# Patient Record
Sex: FEMALE | Race: WHITE | NOT HISPANIC OR LATINO
[De-identification: names, ages, dates, MRNs, and addresses within clinical notes are randomized per-mention and may not be internally consistent; named-entity substitution may affect disease eponyms.]

---

## 2017-04-06 ENCOUNTER — LABORATORY RESULT (OUTPATIENT)
Age: 47
End: 2017-04-06

## 2017-04-06 ENCOUNTER — APPOINTMENT (OUTPATIENT)
Dept: HEART AND VASCULAR | Facility: CLINIC | Age: 47
End: 2017-04-06

## 2017-04-06 VITALS
BODY MASS INDEX: 21.86 KG/M2 | HEART RATE: 65 BPM | HEIGHT: 66 IN | DIASTOLIC BLOOD PRESSURE: 68 MMHG | SYSTOLIC BLOOD PRESSURE: 116 MMHG | WEIGHT: 136 LBS

## 2017-04-10 LAB
ALBUMIN SERPL ELPH-MCNC: 4.6 G/DL
ALP BLD-CCNC: 35 U/L
ALT SERPL-CCNC: 9 U/L
ANION GAP SERPL CALC-SCNC: 18 MMOL/L
AST SERPL-CCNC: 13 U/L
BASOPHILS # BLD AUTO: 0.04 K/UL
BASOPHILS NFR BLD AUTO: 0.6 %
BILIRUB SERPL-MCNC: 0.5 MG/DL
BUN SERPL-MCNC: 17 MG/DL
CALCIUM SERPL-MCNC: 9.1 MG/DL
CHLORIDE SERPL-SCNC: 105 MMOL/L
CHOLEST SERPL-MCNC: 248 MG/DL
CHOLEST/HDLC SERPL: 3.2 RATIO
CO2 SERPL-SCNC: 19 MMOL/L
CREAT SERPL-MCNC: 0.88 MG/DL
EOSINOPHIL # BLD AUTO: 0.19 K/UL
EOSINOPHIL NFR BLD AUTO: 2.8 %
GLUCOSE SERPL-MCNC: 73 MG/DL
HBA1C MFR BLD HPLC: 5.6 %
HCT VFR BLD CALC: 35.4 %
HDLC SERPL-MCNC: 77 MG/DL
HGB BLD-MCNC: 10.9 G/DL
IMM GRANULOCYTES NFR BLD AUTO: 0 %
LDLC SERPL CALC-MCNC: 159 MG/DL
LYMPHOCYTES # BLD AUTO: 2.65 K/UL
LYMPHOCYTES NFR BLD AUTO: 38.4 %
MAN DIFF?: NORMAL
MCHC RBC-ENTMCNC: 25.8 PG
MCHC RBC-ENTMCNC: 30.8 GM/DL
MCV RBC AUTO: 83.7 FL
MONOCYTES # BLD AUTO: 0.65 K/UL
MONOCYTES NFR BLD AUTO: 9.4 %
NEUTROPHILS # BLD AUTO: 3.37 K/UL
NEUTROPHILS NFR BLD AUTO: 48.8 %
PLATELET # BLD AUTO: 237 K/UL
POTASSIUM SERPL-SCNC: 4.2 MMOL/L
PROT SERPL-MCNC: 7.4 G/DL
RBC # BLD: 4.23 M/UL
RBC # FLD: 14.2 %
SODIUM SERPL-SCNC: 142 MMOL/L
TRIGL SERPL-MCNC: 62 MG/DL
TSH SERPL-ACNC: 0.8 UIU/ML
WBC # FLD AUTO: 6.9 K/UL

## 2017-04-13 ENCOUNTER — OTHER (OUTPATIENT)
Age: 47
End: 2017-04-13

## 2017-07-19 ENCOUNTER — APPOINTMENT (OUTPATIENT)
Dept: HEART AND VASCULAR | Facility: CLINIC | Age: 47
End: 2017-07-19

## 2017-07-19 VITALS
DIASTOLIC BLOOD PRESSURE: 82 MMHG | BODY MASS INDEX: 21.38 KG/M2 | HEIGHT: 66 IN | WEIGHT: 133 LBS | HEART RATE: 65 BPM | SYSTOLIC BLOOD PRESSURE: 118 MMHG

## 2017-07-19 DIAGNOSIS — R68.89 OTHER GENERAL SYMPTOMS AND SIGNS: ICD-10-CM

## 2017-07-19 DIAGNOSIS — Z87.898 PERSONAL HISTORY OF OTHER SPECIFIED CONDITIONS: ICD-10-CM

## 2017-07-20 PROBLEM — R68.89 OCCASIONAL FATIGUE: Status: ACTIVE | Noted: 2017-07-20

## 2017-07-20 PROBLEM — Z87.898 HISTORY OF CHRONIC FATIGUE: Status: RESOLVED | Noted: 2017-04-06 | Resolved: 2017-07-20

## 2017-07-24 LAB
ANION GAP SERPL CALC-SCNC: 15 MMOL/L
BASOPHILS # BLD AUTO: 0.02 K/UL
BASOPHILS NFR BLD AUTO: 0.3 %
BUN SERPL-MCNC: 12 MG/DL
CALCIUM SERPL-MCNC: 9.8 MG/DL
CHLORIDE SERPL-SCNC: 107 MMOL/L
CO2 SERPL-SCNC: 21 MMOL/L
CREAT SERPL-MCNC: 0.9 MG/DL
EOSINOPHIL # BLD AUTO: 0.12 K/UL
EOSINOPHIL NFR BLD AUTO: 1.7 %
FERRITIN SERPL-MCNC: 9 NG/ML
GLUCOSE SERPL-MCNC: 85 MG/DL
HCT VFR BLD CALC: 38.2 %
HCYS SERPL-MCNC: 8.9 UMOL/L
HGB BLD-MCNC: 11.8 G/DL
IMM GRANULOCYTES NFR BLD AUTO: 0 %
IRON SATN MFR SERPL: 26 %
IRON SERPL-MCNC: 103 UG/DL
LYMPHOCYTES # BLD AUTO: 2.12 K/UL
LYMPHOCYTES NFR BLD AUTO: 30.6 %
MAN DIFF?: NORMAL
MCHC RBC-ENTMCNC: 27.4 PG
MCHC RBC-ENTMCNC: 30.9 GM/DL
MCV RBC AUTO: 88.6 FL
MONOCYTES # BLD AUTO: 0.67 K/UL
MONOCYTES NFR BLD AUTO: 9.7 %
NEUTROPHILS # BLD AUTO: 3.99 K/UL
NEUTROPHILS NFR BLD AUTO: 57.7 %
PLATELET # BLD AUTO: 240 K/UL
POTASSIUM SERPL-SCNC: 4.7 MMOL/L
RBC # BLD: 4.31 M/UL
RBC # FLD: 14.6 %
SODIUM SERPL-SCNC: 143 MMOL/L
TIBC SERPL-MCNC: 399 UG/DL
UIBC SERPL-MCNC: 296 UG/DL
WBC # FLD AUTO: 6.92 K/UL

## 2018-04-12 ENCOUNTER — APPOINTMENT (OUTPATIENT)
Dept: HEART AND VASCULAR | Facility: CLINIC | Age: 48
End: 2018-04-12

## 2018-04-13 ENCOUNTER — APPOINTMENT (OUTPATIENT)
Dept: HEART AND VASCULAR | Facility: CLINIC | Age: 48
End: 2018-04-13
Payer: COMMERCIAL

## 2018-04-13 VITALS
DIASTOLIC BLOOD PRESSURE: 64 MMHG | HEART RATE: 70 BPM | HEIGHT: 66 IN | BODY MASS INDEX: 22.34 KG/M2 | WEIGHT: 139 LBS | SYSTOLIC BLOOD PRESSURE: 110 MMHG

## 2018-04-13 PROCEDURE — 36415 COLL VENOUS BLD VENIPUNCTURE: CPT

## 2018-04-13 PROCEDURE — 93000 ELECTROCARDIOGRAM COMPLETE: CPT

## 2018-04-13 PROCEDURE — 99396 PREV VISIT EST AGE 40-64: CPT | Mod: 25

## 2018-04-16 LAB
ALBUMIN SERPL ELPH-MCNC: 4.1 G/DL
ALP BLD-CCNC: 33 U/L
ALT SERPL-CCNC: 10 U/L
ANION GAP SERPL CALC-SCNC: 13 MMOL/L
AST SERPL-CCNC: 13 U/L
BASOPHILS # BLD AUTO: 0.01 K/UL
BASOPHILS NFR BLD AUTO: 0.1 %
BILIRUB SERPL-MCNC: 0.2 MG/DL
BUN SERPL-MCNC: 12 MG/DL
CALCIUM SERPL-MCNC: 8.8 MG/DL
CHLORIDE SERPL-SCNC: 102 MMOL/L
CHOLEST SERPL-MCNC: 218 MG/DL
CHOLEST/HDLC SERPL: 3.3 RATIO
CO2 SERPL-SCNC: 26 MMOL/L
CREAT SERPL-MCNC: 0.74 MG/DL
EOSINOPHIL # BLD AUTO: 0.12 K/UL
EOSINOPHIL NFR BLD AUTO: 1.4 %
FERRITIN SERPL-MCNC: 10 NG/ML
GLUCOSE SERPL-MCNC: 79 MG/DL
HBA1C MFR BLD HPLC: 5.3 %
HCT VFR BLD CALC: 38.9 %
HDLC SERPL-MCNC: 67 MG/DL
HGB BLD-MCNC: 11.8 G/DL
IMM GRANULOCYTES NFR BLD AUTO: 0.2 %
LDLC SERPL CALC-MCNC: 136 MG/DL
LYMPHOCYTES # BLD AUTO: 2.55 K/UL
LYMPHOCYTES NFR BLD AUTO: 29.8 %
MAN DIFF?: NORMAL
MCHC RBC-ENTMCNC: 26.3 PG
MCHC RBC-ENTMCNC: 30.3 GM/DL
MCV RBC AUTO: 86.8 FL
MONOCYTES # BLD AUTO: 0.75 K/UL
MONOCYTES NFR BLD AUTO: 8.8 %
NEUTROPHILS # BLD AUTO: 5.1 K/UL
NEUTROPHILS NFR BLD AUTO: 59.7 %
PLATELET # BLD AUTO: 303 K/UL
POTASSIUM SERPL-SCNC: 4.5 MMOL/L
PROT SERPL-MCNC: 6.5 G/DL
RBC # BLD: 4.48 M/UL
RBC # FLD: 16.3 %
SODIUM SERPL-SCNC: 141 MMOL/L
TRIGL SERPL-MCNC: 75 MG/DL
TSH SERPL-ACNC: 0.98 UIU/ML
WBC # FLD AUTO: 8.55 K/UL

## 2018-04-17 ENCOUNTER — APPOINTMENT (OUTPATIENT)
Dept: HEART AND VASCULAR | Facility: CLINIC | Age: 48
End: 2018-04-17

## 2018-06-14 ENCOUNTER — FORM ENCOUNTER (OUTPATIENT)
Age: 48
End: 2018-06-14

## 2018-06-15 ENCOUNTER — APPOINTMENT (OUTPATIENT)
Dept: MAMMOGRAPHY | Facility: HOSPITAL | Age: 48
End: 2018-06-15
Payer: COMMERCIAL

## 2018-06-15 ENCOUNTER — OUTPATIENT (OUTPATIENT)
Dept: OUTPATIENT SERVICES | Facility: HOSPITAL | Age: 48
LOS: 1 days | End: 2018-06-15
Payer: COMMERCIAL

## 2018-06-15 PROCEDURE — 77067 SCR MAMMO BI INCL CAD: CPT

## 2018-06-15 PROCEDURE — 77063 BREAST TOMOSYNTHESIS BI: CPT

## 2018-06-15 PROCEDURE — 77067 SCR MAMMO BI INCL CAD: CPT | Mod: 26

## 2018-06-15 PROCEDURE — 77063 BREAST TOMOSYNTHESIS BI: CPT | Mod: 26

## 2018-06-28 ENCOUNTER — FORM ENCOUNTER (OUTPATIENT)
Age: 48
End: 2018-06-28

## 2018-06-29 ENCOUNTER — APPOINTMENT (OUTPATIENT)
Dept: ULTRASOUND IMAGING | Facility: HOSPITAL | Age: 48
End: 2018-06-29
Payer: COMMERCIAL

## 2018-06-29 ENCOUNTER — OUTPATIENT (OUTPATIENT)
Dept: OUTPATIENT SERVICES | Facility: HOSPITAL | Age: 48
LOS: 1 days | End: 2018-06-29
Payer: COMMERCIAL

## 2018-06-29 PROCEDURE — 76641 ULTRASOUND BREAST COMPLETE: CPT

## 2018-06-29 PROCEDURE — 76641 ULTRASOUND BREAST COMPLETE: CPT | Mod: 26,50

## 2018-07-12 ENCOUNTER — FORM ENCOUNTER (OUTPATIENT)
Age: 48
End: 2018-07-12

## 2018-07-13 ENCOUNTER — APPOINTMENT (OUTPATIENT)
Dept: MAMMOGRAPHY | Facility: HOSPITAL | Age: 48
End: 2018-07-13
Payer: COMMERCIAL

## 2018-07-13 ENCOUNTER — OUTPATIENT (OUTPATIENT)
Dept: OUTPATIENT SERVICES | Facility: HOSPITAL | Age: 48
LOS: 1 days | End: 2018-07-13
Payer: COMMERCIAL

## 2018-07-13 PROCEDURE — 77065 DX MAMMO INCL CAD UNI: CPT

## 2018-07-13 PROCEDURE — 77065 DX MAMMO INCL CAD UNI: CPT | Mod: 26,LT

## 2018-10-03 ENCOUNTER — LABORATORY RESULT (OUTPATIENT)
Age: 48
End: 2018-10-03

## 2018-10-04 LAB
FERRITIN SERPL-MCNC: 20 NG/ML
IRON SATN MFR SERPL: 36 %
IRON SERPL-MCNC: 137 UG/DL
TIBC SERPL-MCNC: 378 UG/DL
UIBC SERPL-MCNC: 241 UG/DL

## 2018-10-11 ENCOUNTER — APPOINTMENT (OUTPATIENT)
Dept: HEART AND VASCULAR | Facility: CLINIC | Age: 48
End: 2018-10-11

## 2018-11-30 ENCOUNTER — APPOINTMENT (OUTPATIENT)
Dept: HEART AND VASCULAR | Facility: CLINIC | Age: 48
End: 2018-11-30
Payer: COMMERCIAL

## 2018-11-30 VITALS
HEART RATE: 80 BPM | SYSTOLIC BLOOD PRESSURE: 116 MMHG | HEIGHT: 66 IN | DIASTOLIC BLOOD PRESSURE: 72 MMHG | WEIGHT: 134 LBS | BODY MASS INDEX: 21.53 KG/M2

## 2018-11-30 DIAGNOSIS — R42 DIZZINESS AND GIDDINESS: ICD-10-CM

## 2018-11-30 PROCEDURE — 99214 OFFICE O/P EST MOD 30 MIN: CPT | Mod: 25

## 2018-11-30 PROCEDURE — 36415 COLL VENOUS BLD VENIPUNCTURE: CPT

## 2018-11-30 PROCEDURE — 93000 ELECTROCARDIOGRAM COMPLETE: CPT

## 2018-12-02 ENCOUNTER — FORM ENCOUNTER (OUTPATIENT)
Age: 48
End: 2018-12-02

## 2018-12-03 ENCOUNTER — APPOINTMENT (OUTPATIENT)
Dept: MAMMOGRAPHY | Facility: HOSPITAL | Age: 48
End: 2018-12-03
Payer: COMMERCIAL

## 2018-12-03 ENCOUNTER — OUTPATIENT (OUTPATIENT)
Dept: OUTPATIENT SERVICES | Facility: HOSPITAL | Age: 48
LOS: 1 days | End: 2018-12-03
Payer: COMMERCIAL

## 2018-12-03 LAB
ANION GAP SERPL CALC-SCNC: 12 MMOL/L
APPEARANCE: CLEAR
APPEARANCE: CLEAR
BACTERIA: NEGATIVE
BASOPHILS # BLD AUTO: 0.02 K/UL
BASOPHILS NFR BLD AUTO: 0.2 %
BILIRUBIN URINE: NEGATIVE
BILIRUBIN URINE: NEGATIVE
BLOOD URINE: NEGATIVE
BLOOD URINE: NEGATIVE
BUN SERPL-MCNC: 17 MG/DL
CALCIUM SERPL-MCNC: 9.5 MG/DL
CHLORIDE SERPL-SCNC: 104 MMOL/L
CO2 SERPL-SCNC: 22 MMOL/L
COLOR: YELLOW
COLOR: YELLOW
CREAT SERPL-MCNC: 0.85 MG/DL
EOSINOPHIL # BLD AUTO: 0.11 K/UL
EOSINOPHIL NFR BLD AUTO: 1.4 %
FERRITIN SERPL-MCNC: 6 NG/ML
GLUCOSE QUALITATIVE U: NEGATIVE MG/DL
GLUCOSE QUALITATIVE U: NEGATIVE MG/DL
GLUCOSE SERPL-MCNC: 89 MG/DL
HBA1C MFR BLD HPLC: 5 %
HCT VFR BLD CALC: 36.1 %
HGB BLD-MCNC: 11.6 G/DL
IMM GRANULOCYTES NFR BLD AUTO: 0.1 %
IRON SATN MFR SERPL: 6 %
IRON SERPL-MCNC: 27 UG/DL
KETONES URINE: ABNORMAL
KETONES URINE: ABNORMAL
LEUKOCYTE ESTERASE URINE: NEGATIVE
LEUKOCYTE ESTERASE URINE: NEGATIVE
LYMPHOCYTES # BLD AUTO: 2.38 K/UL
LYMPHOCYTES NFR BLD AUTO: 29.6 %
MAN DIFF?: NORMAL
MCHC RBC-ENTMCNC: 28.7 PG
MCHC RBC-ENTMCNC: 32.1 GM/DL
MCV RBC AUTO: 89.4 FL
MICROSCOPIC-UA: NORMAL
MONOCYTES # BLD AUTO: 0.68 K/UL
MONOCYTES NFR BLD AUTO: 8.5 %
NEUTROPHILS # BLD AUTO: 4.83 K/UL
NEUTROPHILS NFR BLD AUTO: 60.2 %
NITRITE URINE: NEGATIVE
NITRITE URINE: NEGATIVE
PH URINE: 5.5
PH URINE: 5.5
PLATELET # BLD AUTO: 330 K/UL
POTASSIUM SERPL-SCNC: 4.3 MMOL/L
PROTEIN URINE: NEGATIVE MG/DL
PROTEIN URINE: NEGATIVE MG/DL
RBC # BLD: 4.04 M/UL
RBC # FLD: 12.4 %
RED BLOOD CELLS URINE: 1 /HPF
SODIUM SERPL-SCNC: 138 MMOL/L
SPECIFIC GRAVITY URINE: 1.02
SPECIFIC GRAVITY URINE: 1.02
SQUAMOUS EPITHELIAL CELLS: 2 /HPF
TIBC SERPL-MCNC: 451 UG/DL
UIBC SERPL-MCNC: 424 UG/DL
UROBILINOGEN URINE: NEGATIVE MG/DL
UROBILINOGEN URINE: NEGATIVE MG/DL
WBC # FLD AUTO: 8.03 K/UL
WHITE BLOOD CELLS URINE: 4 /HPF

## 2018-12-03 PROCEDURE — G0279: CPT | Mod: 26

## 2018-12-03 PROCEDURE — 77065 DX MAMMO INCL CAD UNI: CPT | Mod: 26,LT

## 2018-12-03 PROCEDURE — 77065 DX MAMMO INCL CAD UNI: CPT

## 2018-12-03 PROCEDURE — G0279: CPT

## 2018-12-03 RX ORDER — PYRIDOXINE HCL (VITAMIN B6) 100 MG
18 TABLET ORAL TWICE DAILY
Qty: 60 | Refills: 0 | Status: DISCONTINUED | COMMUNITY
Start: 2017-07-19 | End: 2018-12-03

## 2018-12-03 NOTE — PHYSICAL EXAM
[General Appearance - Well Developed] : well developed [Normal Appearance] : normal appearance [Well Groomed] : well groomed [General Appearance - Well Nourished] : well nourished [No Deformities] : no deformities [General Appearance - In No Acute Distress] : no acute distress [Normal Conjunctiva] : the conjunctiva exhibited no abnormalities [Eyelids - No Xanthelasma] : the eyelids demonstrated no xanthelasmas [Normal Oral Mucosa] : normal oral mucosa [No Oral Pallor] : no oral pallor [No Oral Cyanosis] : no oral cyanosis [Normal Jugular Venous A Waves Present] : normal jugular venous A waves present [Normal Jugular Venous V Waves Present] : normal jugular venous V waves present [No Jugular Venous Herbert A Waves] : no jugular venous herbert A waves [Respiration, Rhythm And Depth] : normal respiratory rhythm and effort [Exaggerated Use Of Accessory Muscles For Inspiration] : no accessory muscle use [Auscultation Breath Sounds / Voice Sounds] : lungs were clear to auscultation bilaterally [Heart Rate And Rhythm] : heart rate and rhythm were normal [Heart Sounds] : normal S1 and S2 [Murmurs] : no murmurs present [Abdomen Soft] : soft [Abdomen Tenderness] : non-tender [Abdomen Mass (___ Cm)] : no abdominal mass palpated [Abnormal Walk] : normal gait [Gait - Sufficient For Exercise Testing] : the gait was sufficient for exercise testing [Nail Clubbing] : no clubbing of the fingernails [Cyanosis, Localized] : no localized cyanosis [Petechial Hemorrhages (___cm)] : no petechial hemorrhages [Skin Color & Pigmentation] : normal skin color and pigmentation [] : no rash [No Venous Stasis] : no venous stasis [Skin Lesions] : no skin lesions [No Skin Ulcers] : no skin ulcer [No Xanthoma] : no  xanthoma was observed [Oriented To Time, Place, And Person] : oriented to person, place, and time [Affect] : the affect was normal [Mood] : the mood was normal [No Anxiety] : not feeling anxious

## 2018-12-03 NOTE — REVIEW OF SYSTEMS
[Feeling Fatigued] : feeling fatigued [Loss Of Hearing] : hearing loss [see HPI] : see HPI [Negative] : Heme/Lymph

## 2018-12-03 NOTE — HISTORY OF PRESENT ILLNESS
[FreeTextEntry1] : The patient was taking her iron 324 mg every other day. She reduced it because she felt that it caused frequency. Three weeks ago she stopped it. She has no dysuria. She had nocturia also. She is dizzy upon arising. She does not have vertigo. It is mild occasional and passes spontaneously. It also occurs when she turns. It occurs usually once sometimes twice a month. Her periods have been slightly lighter.

## 2018-12-03 NOTE — DISCUSSION/SUMMARY
[FreeTextEntry1] : The patient has iron deficiency anemia. An iron tablet daily with vitamin C was recommended. She has frequency and a urinalysis is normal. It is resolved off iron pills. She will change and take a different iron tablet. Her dizziness is mild. She will arise more slowly.

## 2018-12-10 ENCOUNTER — APPOINTMENT (OUTPATIENT)
Dept: VASCULAR SURGERY | Facility: CLINIC | Age: 48
End: 2018-12-10
Payer: COMMERCIAL

## 2018-12-10 VITALS
WEIGHT: 134 LBS | BODY MASS INDEX: 21.53 KG/M2 | TEMPERATURE: 98.6 F | OXYGEN SATURATION: 98 % | DIASTOLIC BLOOD PRESSURE: 72 MMHG | HEART RATE: 97 BPM | HEIGHT: 66 IN | SYSTOLIC BLOOD PRESSURE: 110 MMHG

## 2018-12-10 PROCEDURE — 99203 OFFICE O/P NEW LOW 30 MIN: CPT

## 2018-12-17 ENCOUNTER — APPOINTMENT (OUTPATIENT)
Dept: MAMMOGRAPHY | Facility: HOSPITAL | Age: 48
End: 2018-12-17

## 2018-12-17 ENCOUNTER — APPOINTMENT (OUTPATIENT)
Dept: HEART AND VASCULAR | Facility: CLINIC | Age: 48
End: 2018-12-17

## 2018-12-20 ENCOUNTER — APPOINTMENT (OUTPATIENT)
Dept: VASCULAR SURGERY | Facility: CLINIC | Age: 48
End: 2018-12-20
Payer: COMMERCIAL

## 2018-12-20 PROCEDURE — 93970 EXTREMITY STUDY: CPT

## 2019-04-16 ENCOUNTER — APPOINTMENT (OUTPATIENT)
Dept: HEART AND VASCULAR | Facility: CLINIC | Age: 49
End: 2019-04-16

## 2019-04-16 ENCOUNTER — APPOINTMENT (OUTPATIENT)
Dept: HEART AND VASCULAR | Facility: CLINIC | Age: 49
End: 2019-04-16
Payer: COMMERCIAL

## 2019-04-16 VITALS
WEIGHT: 132 LBS | BODY MASS INDEX: 21.21 KG/M2 | HEIGHT: 66 IN | SYSTOLIC BLOOD PRESSURE: 108 MMHG | HEART RATE: 66 BPM | DIASTOLIC BLOOD PRESSURE: 60 MMHG

## 2019-04-16 PROCEDURE — 99396 PREV VISIT EST AGE 40-64: CPT | Mod: 25

## 2019-04-16 PROCEDURE — 36415 COLL VENOUS BLD VENIPUNCTURE: CPT

## 2019-04-16 PROCEDURE — 93000 ELECTROCARDIOGRAM COMPLETE: CPT

## 2019-04-17 NOTE — DISCUSSION/SUMMARY
[FreeTextEntry1] : The patient is taking a small dose of iron but her iron tests are normal. Her hemoglobin is also normal. Her breast biopsy was negative. She is up to date on health maintenance.

## 2019-04-17 NOTE — HISTORY OF PRESENT ILLNESS
[FreeTextEntry1] : The patient had a breast biopsy and it is reported normal.She cycles once a week. Her diet is fair. She has abnormal periods and was diagnosed with endometriosis. Her biopsy was negative. She has seen ENT. She had an eye examination in the summer a Pap smear in November and a mammogram recently. She is fasting.

## 2019-04-17 NOTE — PHYSICAL EXAM
[General Appearance - Well Developed] : well developed [Well Groomed] : well groomed [Normal Appearance] : normal appearance [No Deformities] : no deformities [General Appearance - Well Nourished] : well nourished [General Appearance - In No Acute Distress] : no acute distress [Normal Conjunctiva] : the conjunctiva exhibited no abnormalities [Eyelids - No Xanthelasma] : the eyelids demonstrated no xanthelasmas [No Oral Pallor] : no oral pallor [Normal Oral Mucosa] : normal oral mucosa [No Oral Cyanosis] : no oral cyanosis [Normal Jugular Venous A Waves Present] : normal jugular venous A waves present [Normal Jugular Venous V Waves Present] : normal jugular venous V waves present [No Jugular Venous Herbert A Waves] : no jugular venous herbert A waves [Respiration, Rhythm And Depth] : normal respiratory rhythm and effort [Exaggerated Use Of Accessory Muscles For Inspiration] : no accessory muscle use [Auscultation Breath Sounds / Voice Sounds] : lungs were clear to auscultation bilaterally [Murmurs] : no murmurs present [Heart Rate And Rhythm] : heart rate and rhythm were normal [Heart Sounds] : normal S1 and S2 [Abdomen Soft] : soft [Abdomen Tenderness] : non-tender [Abdomen Mass (___ Cm)] : no abdominal mass palpated [Abnormal Walk] : normal gait [Gait - Sufficient For Exercise Testing] : the gait was sufficient for exercise testing [Nail Clubbing] : no clubbing of the fingernails [Cyanosis, Localized] : no localized cyanosis [Petechial Hemorrhages (___cm)] : no petechial hemorrhages [Skin Color & Pigmentation] : normal skin color and pigmentation [] : no rash [No Venous Stasis] : no venous stasis [Skin Lesions] : no skin lesions [No Xanthoma] : no  xanthoma was observed [No Skin Ulcers] : no skin ulcer [FreeTextEntry1] : Breast exam refused [Oriented To Time, Place, And Person] : oriented to person, place, and time [Affect] : the affect was normal [Mood] : the mood was normal [No Anxiety] : not feeling anxious

## 2019-04-18 LAB
25(OH)D3 SERPL-MCNC: 16.3 NG/ML
ALBUMIN SERPL ELPH-MCNC: 4.6 G/DL
ALP BLD-CCNC: 35 U/L
ALT SERPL-CCNC: 11 U/L
ANION GAP SERPL CALC-SCNC: 18 MMOL/L
AST SERPL-CCNC: 15 U/L
BASOPHILS # BLD AUTO: 0.04 K/UL
BASOPHILS NFR BLD AUTO: 0.8 %
BILIRUB SERPL-MCNC: 0.3 MG/DL
BUN SERPL-MCNC: 14 MG/DL
CALCIUM SERPL-MCNC: 9.4 MG/DL
CHLORIDE SERPL-SCNC: 103 MMOL/L
CHOLEST SERPL-MCNC: 228 MG/DL
CHOLEST/HDLC SERPL: 3.2 RATIO
CO2 SERPL-SCNC: 22 MMOL/L
CREAT SERPL-MCNC: 0.82 MG/DL
EOSINOPHIL # BLD AUTO: 0.13 K/UL
EOSINOPHIL NFR BLD AUTO: 2.5 %
ESTIMATED AVERAGE GLUCOSE: 97 MG/DL
FERRITIN SERPL-MCNC: 21 NG/ML
FOLATE SERPL-MCNC: 12.1 NG/ML
GLUCOSE SERPL-MCNC: 89 MG/DL
HBA1C MFR BLD HPLC: 5 %
HCT VFR BLD CALC: 38.9 %
HDLC SERPL-MCNC: 71 MG/DL
HGB BLD-MCNC: 11.6 G/DL
IMM GRANULOCYTES NFR BLD AUTO: 0.4 %
IRON SATN MFR SERPL: 37 %
IRON SERPL-MCNC: 151 UG/DL
LDLC SERPL CALC-MCNC: 145 MG/DL
LYMPHOCYTES # BLD AUTO: 1.64 K/UL
LYMPHOCYTES NFR BLD AUTO: 31.8 %
MAN DIFF?: NORMAL
MCHC RBC-ENTMCNC: 29 PG
MCHC RBC-ENTMCNC: 29.8 GM/DL
MCV RBC AUTO: 97.3 FL
MONOCYTES # BLD AUTO: 0.45 K/UL
MONOCYTES NFR BLD AUTO: 8.7 %
NEUTROPHILS # BLD AUTO: 2.88 K/UL
NEUTROPHILS NFR BLD AUTO: 55.8 %
PLATELET # BLD AUTO: 283 K/UL
POTASSIUM SERPL-SCNC: 4.8 MMOL/L
PROT SERPL-MCNC: 6.9 G/DL
RBC # BLD: 4 M/UL
RBC # FLD: 13.2 %
SODIUM SERPL-SCNC: 143 MMOL/L
TIBC SERPL-MCNC: 412 UG/DL
TRIGL SERPL-MCNC: 61 MG/DL
TSH SERPL-ACNC: 1.4 UIU/ML
UIBC SERPL-MCNC: 261 UG/DL
VIT B12 SERPL-MCNC: 416 PG/ML
WBC # FLD AUTO: 5.16 K/UL

## 2019-08-30 ENCOUNTER — APPOINTMENT (OUTPATIENT)
Dept: ORTHOPEDIC SURGERY | Facility: CLINIC | Age: 49
End: 2019-08-30

## 2019-09-25 ENCOUNTER — APPOINTMENT (OUTPATIENT)
Dept: HEART AND VASCULAR | Facility: CLINIC | Age: 49
End: 2019-09-25
Payer: COMMERCIAL

## 2019-09-25 VITALS
WEIGHT: 135 LBS | HEIGHT: 66 IN | DIASTOLIC BLOOD PRESSURE: 78 MMHG | SYSTOLIC BLOOD PRESSURE: 110 MMHG | BODY MASS INDEX: 21.69 KG/M2 | OXYGEN SATURATION: 99 % | HEART RATE: 80 BPM

## 2019-09-25 DIAGNOSIS — M79.89 OTHER SPECIFIED SOFT TISSUE DISORDERS: ICD-10-CM

## 2019-09-25 PROCEDURE — 99213 OFFICE O/P EST LOW 20 MIN: CPT

## 2019-09-25 RX ORDER — DEXTROMETHORPHAN HB/DOXYLAMINE 15-6.25/15
27 SOLUTION, ORAL ORAL
Refills: 0 | Status: DISCONTINUED | COMMUNITY
Start: 2018-12-03 | End: 2019-09-25

## 2019-09-25 NOTE — HISTORY OF PRESENT ILLNESS
[FreeTextEntry1] : The patient describes left leg swelling since August. She was seen in urgent care and her d-dimer was okay. She was seen by sports medicine diagnosed with bursitis and given diclofenac cream. This caused stomach upset and was discontinued. The patient walks eight-10,000 steps a day. Initially she had knee pain but now has none. She feels that her whole leg is swollen but not her foot. She was seen for varicose veins last December and an ultrasound was done. The patient is not fasting.

## 2019-09-25 NOTE — DISCUSSION/SUMMARY
[FreeTextEntry1] : The patient has varicose veins. Her leg is not visibly swollen. She has no pain. She was referred to Dr. Hooks. She will elevate her legs and get compression stockings.

## 2019-09-25 NOTE — PHYSICAL EXAM
[General Appearance - Well Developed] : well developed [Normal Appearance] : normal appearance [General Appearance - Well Nourished] : well nourished [Well Groomed] : well groomed [No Deformities] : no deformities [General Appearance - In No Acute Distress] : no acute distress [Eyelids - No Xanthelasma] : the eyelids demonstrated no xanthelasmas [Normal Conjunctiva] : the conjunctiva exhibited no abnormalities [No Oral Pallor] : no oral pallor [Normal Oral Mucosa] : normal oral mucosa [Normal Jugular Venous A Waves Present] : normal jugular venous A waves present [No Oral Cyanosis] : no oral cyanosis [Normal Jugular Venous V Waves Present] : normal jugular venous V waves present [No Jugular Venous Herbert A Waves] : no jugular venous herbert A waves [Respiration, Rhythm And Depth] : normal respiratory rhythm and effort [Auscultation Breath Sounds / Voice Sounds] : lungs were clear to auscultation bilaterally [Exaggerated Use Of Accessory Muscles For Inspiration] : no accessory muscle use [Heart Rate And Rhythm] : heart rate and rhythm were normal [Heart Sounds] : normal S1 and S2 [Murmurs] : no murmurs present [Abdomen Soft] : soft [Abdomen Tenderness] : non-tender [Abdomen Mass (___ Cm)] : no abdominal mass palpated [Abnormal Walk] : normal gait [Gait - Sufficient For Exercise Testing] : the gait was sufficient for exercise testing [Nail Clubbing] : no clubbing of the fingernails [Cyanosis, Localized] : no localized cyanosis [Petechial Hemorrhages (___cm)] : no petechial hemorrhages [Skin Color & Pigmentation] : normal skin color and pigmentation [] : no rash [Skin Lesions] : no skin lesions [No Skin Ulcers] : no skin ulcer [No Xanthoma] : no  xanthoma was observed [FreeTextEntry1] : varicose veins [Oriented To Time, Place, And Person] : oriented to person, place, and time [Affect] : the affect was normal [Mood] : the mood was normal [No Anxiety] : not feeling anxious

## 2019-09-26 DIAGNOSIS — D64.9 ANEMIA, UNSPECIFIED: ICD-10-CM

## 2019-09-28 LAB
25(OH)D3 SERPL-MCNC: 36.6 NG/ML
ALBUMIN SERPL ELPH-MCNC: 4.8 G/DL
ALP BLD-CCNC: 44 U/L
ALT SERPL-CCNC: 8 U/L
ANION GAP SERPL CALC-SCNC: 15 MMOL/L
AST SERPL-CCNC: 18 U/L
BASOPHILS # BLD AUTO: 0.04 K/UL
BASOPHILS NFR BLD AUTO: 0.5 %
BILIRUB SERPL-MCNC: 0.3 MG/DL
BUN SERPL-MCNC: 12 MG/DL
CALCIUM SERPL-MCNC: 9.5 MG/DL
CHLORIDE SERPL-SCNC: 102 MMOL/L
CHOLEST SERPL-MCNC: 239 MG/DL
CHOLEST/HDLC SERPL: 3 RATIO
CO2 SERPL-SCNC: 23 MMOL/L
CREAT SERPL-MCNC: 0.73 MG/DL
EOSINOPHIL # BLD AUTO: 0.18 K/UL
EOSINOPHIL NFR BLD AUTO: 2.3 %
FERRITIN SERPL-MCNC: 5 NG/ML
GLUCOSE SERPL-MCNC: 84 MG/DL
HCT VFR BLD CALC: 34.9 %
HDLC SERPL-MCNC: 79 MG/DL
HGB BLD-MCNC: 10.5 G/DL
IMM GRANULOCYTES NFR BLD AUTO: 0.3 %
IRON SATN MFR SERPL: 5 %
IRON SERPL-MCNC: 28 UG/DL
LDLC SERPL CALC-MCNC: 149 MG/DL
LYMPHOCYTES # BLD AUTO: 2.46 K/UL
LYMPHOCYTES NFR BLD AUTO: 31.5 %
MAN DIFF?: NORMAL
MCHC RBC-ENTMCNC: 24.9 PG
MCHC RBC-ENTMCNC: 30.1 GM/DL
MCV RBC AUTO: 82.9 FL
MONOCYTES # BLD AUTO: 0.71 K/UL
MONOCYTES NFR BLD AUTO: 9.1 %
NEUTROPHILS # BLD AUTO: 4.39 K/UL
NEUTROPHILS NFR BLD AUTO: 56.3 %
PLATELET # BLD AUTO: 312 K/UL
POTASSIUM SERPL-SCNC: 4 MMOL/L
PROT SERPL-MCNC: 7.3 G/DL
RBC # BLD: 4.21 M/UL
RBC # FLD: 14.3 %
SODIUM SERPL-SCNC: 140 MMOL/L
TIBC SERPL-MCNC: 540 UG/DL
TRIGL SERPL-MCNC: 53 MG/DL
TSH SERPL-ACNC: 1.43 UIU/ML
UIBC SERPL-MCNC: 512 UG/DL
WBC # FLD AUTO: 7.8 K/UL

## 2019-10-15 ENCOUNTER — APPOINTMENT (OUTPATIENT)
Dept: VASCULAR SURGERY | Facility: CLINIC | Age: 49
End: 2019-10-15
Payer: COMMERCIAL

## 2019-10-15 DIAGNOSIS — I83.90 ASYMPTOMATIC VARICOSE VEINS OF UNSPECIFIED LOWER EXTREMITY: ICD-10-CM

## 2019-10-15 PROCEDURE — 99213 OFFICE O/P EST LOW 20 MIN: CPT

## 2019-10-17 PROBLEM — I83.90 VARICOSE VEINS: Status: ACTIVE | Noted: 2018-12-10

## 2019-10-17 NOTE — HISTORY OF PRESENT ILLNESS
[FreeTextEntry1] : 49 y/o F with bilateral varicose veins comes for FU. She states over the last few months she has noticed left knee pain and swelling. Comes and goes. Went to see her PCP who recommended she use topical NSAIDS which helped. They also recommended PT but she has not started. She wanted to come here and make sure the veins were not causing her symptoms. We recommended stab phlebectomy for her bulging varicose veins and the achiness she was feeling.

## 2019-10-17 NOTE — PHYSICAL EXAM
[2+] : left 2+ [Ankle Swelling (On Exam)] : present [Ankle Swelling Bilaterally] : bilaterally  [Varicose Veins Of Lower Extremities] : bilaterally [Ankle Swelling On The Right] : mild [No Rash or Lesion] : No rash or lesion [Alert] : alert [Oriented to Person] : oriented to person [Oriented to Place] : oriented to place [Oriented to Time] : oriented to time [Calm] : calm [] : not present [de-identified] : Ambulatory [de-identified] : Left knee with mild effusion, VV medial calf bilaterally

## 2019-10-17 NOTE — ASSESSMENT
[FreeTextEntry1] : 49 year old female with left knee pain and swelling. Her current symptoms are not related to her veins. Recommend she FU with orthopedist for the knee pain, hold off on vascular intervention at this time. FU PRN.  [Arterial/Venous Disease] : arterial/venous disease

## 2019-10-21 ENCOUNTER — APPOINTMENT (OUTPATIENT)
Dept: RADIOLOGY | Facility: CLINIC | Age: 49
End: 2019-10-21

## 2019-10-21 ENCOUNTER — APPOINTMENT (OUTPATIENT)
Dept: ORTHOPEDIC SURGERY | Facility: CLINIC | Age: 49
End: 2019-10-21
Payer: COMMERCIAL

## 2019-10-21 ENCOUNTER — OUTPATIENT (OUTPATIENT)
Dept: OUTPATIENT SERVICES | Facility: HOSPITAL | Age: 49
LOS: 1 days | End: 2019-10-21
Payer: COMMERCIAL

## 2019-10-21 ENCOUNTER — FORM ENCOUNTER (OUTPATIENT)
Age: 49
End: 2019-10-21

## 2019-10-21 VITALS — HEIGHT: 66 IN | WEIGHT: 135 LBS | BODY MASS INDEX: 21.69 KG/M2 | RESPIRATION RATE: 16 BRPM

## 2019-10-21 DIAGNOSIS — M22.2X2 PATELLOFEMORAL DISORDERS, LEFT KNEE: ICD-10-CM

## 2019-10-21 DIAGNOSIS — M23.92 UNSPECIFIED INTERNAL DERANGEMENT OF LEFT KNEE: ICD-10-CM

## 2019-10-21 PROCEDURE — 99203 OFFICE O/P NEW LOW 30 MIN: CPT

## 2019-10-22 PROCEDURE — 73564 X-RAY EXAM KNEE 4 OR MORE: CPT | Mod: 26,LT

## 2019-10-22 PROCEDURE — 73564 X-RAY EXAM KNEE 4 OR MORE: CPT

## 2019-12-11 ENCOUNTER — APPOINTMENT (OUTPATIENT)
Dept: ORTHOPEDIC SURGERY | Facility: CLINIC | Age: 49
End: 2019-12-11

## 2019-12-29 ENCOUNTER — FORM ENCOUNTER (OUTPATIENT)
Age: 49
End: 2019-12-29

## 2019-12-30 ENCOUNTER — APPOINTMENT (OUTPATIENT)
Dept: MRI IMAGING | Facility: HOSPITAL | Age: 49
End: 2019-12-30

## 2019-12-30 ENCOUNTER — OUTPATIENT (OUTPATIENT)
Dept: OUTPATIENT SERVICES | Facility: HOSPITAL | Age: 49
LOS: 1 days | End: 2019-12-30
Payer: COMMERCIAL

## 2019-12-30 PROCEDURE — 73721 MRI JNT OF LWR EXTRE W/O DYE: CPT

## 2019-12-30 PROCEDURE — 73721 MRI JNT OF LWR EXTRE W/O DYE: CPT | Mod: 26,LT

## 2020-01-06 ENCOUNTER — APPOINTMENT (OUTPATIENT)
Dept: ORTHOPEDIC SURGERY | Facility: CLINIC | Age: 50
End: 2020-01-06
Payer: COMMERCIAL

## 2020-01-06 VITALS — WEIGHT: 135 LBS | RESPIRATION RATE: 16 BRPM | HEIGHT: 66 IN | BODY MASS INDEX: 21.69 KG/M2

## 2020-01-06 DIAGNOSIS — M22.42 CHONDROMALACIA PATELLAE, LEFT KNEE: ICD-10-CM

## 2020-01-06 PROCEDURE — 99213 OFFICE O/P EST LOW 20 MIN: CPT

## 2020-01-23 ENCOUNTER — LABORATORY RESULT (OUTPATIENT)
Age: 50
End: 2020-01-23

## 2020-01-23 ENCOUNTER — APPOINTMENT (OUTPATIENT)
Dept: HEART AND VASCULAR | Facility: CLINIC | Age: 50
End: 2020-01-23
Payer: COMMERCIAL

## 2020-01-23 VITALS
HEIGHT: 66 IN | HEART RATE: 71 BPM | BODY MASS INDEX: 22.66 KG/M2 | OXYGEN SATURATION: 99 % | SYSTOLIC BLOOD PRESSURE: 102 MMHG | WEIGHT: 141 LBS | DIASTOLIC BLOOD PRESSURE: 62 MMHG

## 2020-01-23 DIAGNOSIS — E55.9 VITAMIN D DEFICIENCY, UNSPECIFIED: ICD-10-CM

## 2020-01-23 PROCEDURE — 99213 OFFICE O/P EST LOW 20 MIN: CPT | Mod: 25

## 2020-01-26 PROBLEM — E55.9 VITAMIN D DEFICIENCY: Status: ACTIVE | Noted: 2019-04-18

## 2020-01-26 RX ORDER — CALCIUM CARBONATE 500(1250)
500 TABLET ORAL
Refills: 0 | Status: DISCONTINUED | COMMUNITY
End: 2020-01-26

## 2020-01-26 RX ORDER — CHROMIUM 200 MCG
1000 TABLET ORAL
Refills: 0 | Status: DISCONTINUED | COMMUNITY
Start: 2019-04-18 | End: 2020-01-26

## 2020-01-26 NOTE — DISCUSSION/SUMMARY
[FreeTextEntry1] : The patient has a history of iron deficiency anemia. She has stopped her iron. Her iron level is low. She will restart it along with her vitamin D. She was encouraged to exercise more. She will followup with her gynecologist. She requests genetic testing for breast cancer despite not having a family history. This was performed.

## 2020-01-26 NOTE — PHYSICAL EXAM
[General Appearance - Well Developed] : well developed [Normal Appearance] : normal appearance [Well Groomed] : well groomed [General Appearance - Well Nourished] : well nourished [No Deformities] : no deformities [General Appearance - In No Acute Distress] : no acute distress [Normal Conjunctiva] : the conjunctiva exhibited no abnormalities [Eyelids - No Xanthelasma] : the eyelids demonstrated no xanthelasmas [Normal Oral Mucosa] : normal oral mucosa [No Oral Pallor] : no oral pallor [No Oral Cyanosis] : no oral cyanosis [Normal Jugular Venous A Waves Present] : normal jugular venous A waves present [Normal Jugular Venous V Waves Present] : normal jugular venous V waves present [No Jugular Venous Herbert A Waves] : no jugular venous herbert A waves [Respiration, Rhythm And Depth] : normal respiratory rhythm and effort [Exaggerated Use Of Accessory Muscles For Inspiration] : no accessory muscle use [Auscultation Breath Sounds / Voice Sounds] : lungs were clear to auscultation bilaterally [Heart Rate And Rhythm] : heart rate and rhythm were normal [Heart Sounds] : normal S1 and S2 [Murmurs] : no murmurs present [Abdomen Soft] : soft [Abdomen Tenderness] : non-tender [Abdomen Mass (___ Cm)] : no abdominal mass palpated [Abnormal Walk] : normal gait [Gait - Sufficient For Exercise Testing] : the gait was sufficient for exercise testing [Nail Clubbing] : no clubbing of the fingernails [Cyanosis, Localized] : no localized cyanosis [Petechial Hemorrhages (___cm)] : no petechial hemorrhages [Skin Color & Pigmentation] : normal skin color and pigmentation [] : no rash [Skin Lesions] : no skin lesions [No Skin Ulcers] : no skin ulcer [No Xanthoma] : no  xanthoma was observed [FreeTextEntry1] : varicose veins [Oriented To Time, Place, And Person] : oriented to person, place, and time [Affect] : the affect was normal [Mood] : the mood was normal [No Anxiety] : not feeling anxious

## 2020-01-26 NOTE — HISTORY OF PRESENT ILLNESS
[FreeTextEntry1] : The patient was told of cartilage damage. She walks 9000 steps a day. She is still getting her periods. Her urologist told her about a fibroid. She had a biopsy of her left breast which was negative. She is not fasting.

## 2020-01-27 LAB
25(OH)D3 SERPL-MCNC: 26 NG/ML
ANION GAP SERPL CALC-SCNC: 12 MMOL/L
BUN SERPL-MCNC: 16 MG/DL
CALCIUM SERPL-MCNC: 9.4 MG/DL
CHLORIDE SERPL-SCNC: 102 MMOL/L
CHOLEST SERPL-MCNC: 242 MG/DL
CHOLEST/HDLC SERPL: 3.7 RATIO
CO2 SERPL-SCNC: 25 MMOL/L
CREAT SERPL-MCNC: 0.78 MG/DL
FERRITIN SERPL-MCNC: 6 NG/ML
GLUCOSE SERPL-MCNC: 86 MG/DL
HCT VFR BLD CALC: 35.2 %
HDLC SERPL-MCNC: 65 MG/DL
HGB BLD-MCNC: 10.6 G/DL
IRON SATN MFR SERPL: 5 %
IRON SERPL-MCNC: 21 UG/DL
LDLC SERPL CALC-MCNC: 162 MG/DL
MAGNESIUM SERPL-MCNC: 2.1 MG/DL
POTASSIUM SERPL-SCNC: 4.4 MMOL/L
SODIUM SERPL-SCNC: 139 MMOL/L
TIBC SERPL-MCNC: 417 UG/DL
TRIGL SERPL-MCNC: 76 MG/DL
TSH SERPL-ACNC: 1.35 UIU/ML
UIBC SERPL-MCNC: 396 UG/DL

## 2020-01-28 LAB — COPPER SERPL-MCNC: 100 UG/DL

## 2020-02-04 LAB — MENADIONE SERPL-MCNC: 0.57 NG/ML

## 2020-05-08 ENCOUNTER — APPOINTMENT (OUTPATIENT)
Dept: HEART AND VASCULAR | Facility: CLINIC | Age: 50
End: 2020-05-08
Payer: COMMERCIAL

## 2020-05-08 ENCOUNTER — LABORATORY RESULT (OUTPATIENT)
Age: 50
End: 2020-05-08

## 2020-05-08 VITALS
SYSTOLIC BLOOD PRESSURE: 106 MMHG | HEIGHT: 66 IN | HEART RATE: 68 BPM | OXYGEN SATURATION: 99 % | BODY MASS INDEX: 22.34 KG/M2 | WEIGHT: 139 LBS | DIASTOLIC BLOOD PRESSURE: 70 MMHG

## 2020-05-08 DIAGNOSIS — L98.9 DISORDER OF THE SKIN AND SUBCUTANEOUS TISSUE, UNSPECIFIED: ICD-10-CM

## 2020-05-08 PROCEDURE — 93000 ELECTROCARDIOGRAM COMPLETE: CPT

## 2020-05-08 PROCEDURE — 36415 COLL VENOUS BLD VENIPUNCTURE: CPT

## 2020-05-08 PROCEDURE — 93970 EXTREMITY STUDY: CPT

## 2020-05-08 PROCEDURE — 99396 PREV VISIT EST AGE 40-64: CPT | Mod: 25

## 2020-05-11 LAB
25(OH)D3 SERPL-MCNC: 20.8 NG/ML
ALBUMIN SERPL ELPH-MCNC: 4.8 G/DL
ALP BLD-CCNC: 37 U/L
ALT SERPL-CCNC: 12 U/L
ANION GAP SERPL CALC-SCNC: 12 MMOL/L
AST SERPL-CCNC: 18 U/L
BASOPHILS # BLD AUTO: 0.04 K/UL
BASOPHILS NFR BLD AUTO: 0.5 %
BILIRUB SERPL-MCNC: <0.2 MG/DL
BUN SERPL-MCNC: 11 MG/DL
CALCIUM SERPL-MCNC: 9.9 MG/DL
CHLORIDE SERPL-SCNC: 105 MMOL/L
CHOLEST SERPL-MCNC: 265 MG/DL
CHOLEST/HDLC SERPL: 3.3 RATIO
CO2 SERPL-SCNC: 24 MMOL/L
CREAT SERPL-MCNC: 0.68 MG/DL
EOSINOPHIL # BLD AUTO: 0.13 K/UL
EOSINOPHIL NFR BLD AUTO: 1.8 %
ESTIMATED AVERAGE GLUCOSE: 103 MG/DL
GLUCOSE SERPL-MCNC: 95 MG/DL
HBA1C MFR BLD HPLC: 5.2 %
HCT VFR BLD CALC: 37.8 %
HDLC SERPL-MCNC: 80 MG/DL
HGB BLD-MCNC: 10.8 G/DL
IMM GRANULOCYTES NFR BLD AUTO: 0.3 %
LDLC SERPL CALC-MCNC: 170 MG/DL
LYMPHOCYTES # BLD AUTO: 2.11 K/UL
LYMPHOCYTES NFR BLD AUTO: 28.5 %
MAN DIFF?: NORMAL
MCHC RBC-ENTMCNC: 25.2 PG
MCHC RBC-ENTMCNC: 28.6 GM/DL
MCV RBC AUTO: 88.3 FL
MONOCYTES # BLD AUTO: 0.72 K/UL
MONOCYTES NFR BLD AUTO: 9.7 %
NEUTROPHILS # BLD AUTO: 4.38 K/UL
NEUTROPHILS NFR BLD AUTO: 59.2 %
PLATELET # BLD AUTO: 250 K/UL
POTASSIUM SERPL-SCNC: 4.7 MMOL/L
PROT SERPL-MCNC: 6.9 G/DL
RBC # BLD: 4.28 M/UL
RBC # FLD: 15.8 %
SODIUM SERPL-SCNC: 142 MMOL/L
TRIGL SERPL-MCNC: 71 MG/DL
TSH SERPL-ACNC: 1.71 UIU/ML
WBC # FLD AUTO: 7.4 K/UL

## 2020-05-11 NOTE — HISTORY OF PRESENT ILLNESS
[FreeTextEntry1] : 50 year old female with iron deficient anemia here for follow up. Since last visit, patient has not really been taking her iron supplements or Vit D. She only takes her iron every other day during her menstrual cycle. She still reports regular but heavy cycles. Denies any dizziness, BERNSTEIN, palpitations.\par \par She continues to report right medial knee swelling since 07/2019.She followed up with orthopedics DR Bowie , MRI done revealed possible possible minor ligament tear and cause of pain 2/2 chondromalacia of patella. Recommended injection therapy however she denied at that time and opted for conservative treatment. She proceed with PT with minor relief. She also  went for a second opinion at special surgery and was told swelling / pain is not ortho related. During time of onset, she denied any direct trauma but was practicing yoga. Shes reporting mild, lateral, left knee swelling with mild pinning sensation, on and off, with on and off pain radiating from proximal tibia radiating down to plantar side of foot. Denies any redness, heat or weakness. Possible DDimer was done at an outpatient urgent care around that time which was negative. She continues to walk about 6,000 steps daily without complications.\par \par She admits to a healthier diet since last visit. Denies chest pain, shortness of breath, palpitations, dizziness. \par \par Has followed up with her ophthalmologist last year and her dentist earlier this year for a cavity fill. She is due for mammogram at the end of this year. She is up to date with her GYN and PAP Smears. She refuses immunizations.

## 2020-05-11 NOTE — REVIEW OF SYSTEMS
[Eyeglasses] : currently wearing eyeglasses [Joint Swelling] : joint swelling [Fever] : no fever [Headache] : no headache [Chills] : no chills [Eye Pain] : no eye pain [Earache] : no earache [Discharge From The Ears] : no discharge from the ears [Sore Throat] : no sore throat [Shortness Of Breath] : no shortness of breath [Dyspnea on exertion] : not dyspnea during exertion [Chest  Pressure] : no chest pressure [Chest Pain] : no chest pain [Lower Ext Edema] : no extremity edema [Leg Claudication] : no intermittent leg claudication [Palpitations] : no palpitations [Cough] : no cough [Abdominal Pain] : no abdominal pain [Nausea] : no nausea [Heartburn] : no heartburn [Vomiting] : no vomiting [Muscle Cramps] : no muscle cramps [Limb Weakness (Paresis)] : no limb weakness [Skin: A Rash] : no rash: [Dizziness] : no dizziness [Easy Bleeding] : no tendency for easy bleeding [Easy Bruising] : no tendency for easy bruising

## 2020-05-11 NOTE — DISCUSSION/SUMMARY
[FreeTextEntry1] : 50 year old female with PMHX of HLD, iron deficient anemia and varicose veins here for follow up. \par \par Anemia: Current H/H at 10.8/37.8 (05/11). Resent RX for iron supplements.  \par Leg swelling: Minor swelling noted on exam. Venous US: Negative for DVT\par Vit D Deficiency: restart Vit D Supplements\par HLD: LDL at 170. 10 year CVD risk at 0.85%. Advised for diet and exercise for control\par \par \par Ophthalmology: up to date\par Mammography: plans to have done later this year\par Gyn / PAP: up to date\par Immunizations: declining influenza shots\par \par FU 3 months

## 2020-05-11 NOTE — PHYSICAL EXAM
[Normal Appearance] : normal appearance [General Appearance - Well Developed] : well developed [Well Groomed] : well groomed [General Appearance - Well Nourished] : well nourished [General Appearance - In No Acute Distress] : no acute distress [Respiration, Rhythm And Depth] : normal respiratory rhythm and effort [Auscultation Breath Sounds / Voice Sounds] : lungs were clear to auscultation bilaterally [Exaggerated Use Of Accessory Muscles For Inspiration] : no accessory muscle use [Lungs Percussion] : the lungs were normal to percussion [Chest Palpation] : palpation of the chest revealed no abnormalities [Heart Sounds] : normal S1 and S2 [Heart Rate And Rhythm] : heart rate and rhythm were normal [Edema] : no peripheral edema present [Arterial Pulses Normal] : the arterial pulses were normal [Murmurs] : no murmurs present [Veins - Varicosity Changes] : no varicosital changes were noted in the lower extremities [Abdomen Soft] : soft [Bowel Sounds] : normal bowel sounds [Abnormal Walk] : normal gait [Abdomen Tenderness] : non-tender [Gait - Sufficient For Exercise Testing] : the gait was sufficient for exercise testing [Skin Turgor] : normal skin turgor [] : no rash [Skin Color & Pigmentation] : normal skin color and pigmentation [Oriented To Time, Place, And Person] : oriented to person, place, and time [No Venous Stasis] : no venous stasis [Mood] : the mood was normal [Impaired Insight] : insight and judgment were intact [Affect] : the affect was normal [FreeTextEntry1] : mild swelling noted of left, medial leg starting from proximal to mid tibia with no redness, tenderness or heat. Sensory, motor and vascularity of LLE grossly in tact. DP 1+, PT 2+

## 2020-05-13 PROBLEM — L98.9 LEG SORE: Status: ACTIVE | Noted: 2020-05-13

## 2020-08-06 ENCOUNTER — APPOINTMENT (OUTPATIENT)
Dept: HEART AND VASCULAR | Facility: CLINIC | Age: 50
End: 2020-08-06

## 2021-03-24 ENCOUNTER — APPOINTMENT (OUTPATIENT)
Dept: HEART AND VASCULAR | Facility: CLINIC | Age: 51
End: 2021-03-24

## 2021-04-16 ENCOUNTER — APPOINTMENT (OUTPATIENT)
Dept: HEART AND VASCULAR | Facility: CLINIC | Age: 51
End: 2021-04-16
Payer: COMMERCIAL

## 2021-04-16 ENCOUNTER — LABORATORY RESULT (OUTPATIENT)
Age: 51
End: 2021-04-16

## 2021-04-16 VITALS
DIASTOLIC BLOOD PRESSURE: 68 MMHG | WEIGHT: 142 LBS | HEIGHT: 66 IN | HEART RATE: 77 BPM | TEMPERATURE: 98.1 F | BODY MASS INDEX: 22.82 KG/M2 | SYSTOLIC BLOOD PRESSURE: 118 MMHG

## 2021-04-16 PROCEDURE — 99072 ADDL SUPL MATRL&STAF TM PHE: CPT

## 2021-04-16 PROCEDURE — 36415 COLL VENOUS BLD VENIPUNCTURE: CPT

## 2021-04-16 PROCEDURE — 99215 OFFICE O/P EST HI 40 MIN: CPT | Mod: 25

## 2021-04-16 PROCEDURE — 93000 ELECTROCARDIOGRAM COMPLETE: CPT

## 2021-04-18 NOTE — DISCUSSION/SUMMARY
[FreeTextEntry1] : 51 year old female with PMHX of HLD and anemia here for follow sp recent hospitalization at [facility] March 27 -April 03, 2021 for COVID Pneumonia found to have right lower lobe PE's. Patient overall feeling better however now noticing dyspnea on exertion, episodes of tachycardia and chest tightness. \par \par Chest Tightness / BERNSTEIN /Tachycardia: EKG reviewed from [facility] done 04/12/2021 revealing NSR at 77bpm with no signs of ischemia. Symptoms could be stemming from recent COVID infection vs recent pulmonary emboli. Will continue with Eliquis 5mg BID treatment for 3 months. Will send for 7 day Holter to rule out underlying arrhythmia. Less likely, but will get stress echocardiogram to rule out CAD. \par Anemia: Sent labs today to assess. Patient currently on Iron Tabs 3 times a week; will adjust if needed. \par \par Follow up in 6 months with stress echocardiogram.  I have discussed the case with MARGARITA Sherwood. I have personally performed a history, physical exam, and my own medical decision making. I have reviewed the note and agree with the findings and plan.

## 2021-04-18 NOTE — PHYSICAL EXAM
[General Appearance - Well Developed] : well developed [Normal Appearance] : normal appearance [General Appearance - Well Nourished] : well nourished [Normal Conjunctiva] : the conjunctiva exhibited no abnormalities [Eyelids - No Xanthelasma] : the eyelids demonstrated no xanthelasmas [] : no respiratory distress [Respiration, Rhythm And Depth] : normal respiratory rhythm and effort [Auscultation Breath Sounds / Voice Sounds] : lungs were clear to auscultation bilaterally [Heart Rate And Rhythm] : heart rate and rhythm were normal [Heart Sounds] : normal S1 and S2 [Murmurs] : no murmurs present [Arterial Pulses Normal] : the arterial pulses were normal [Edema] : no peripheral edema present [Veins - Varicosity Changes] : no varicosital changes were noted in the lower extremities [Abdomen Soft] : soft [Abdomen Tenderness] : non-tender [Abnormal Walk] : normal gait [Gait - Sufficient For Exercise Testing] : the gait was sufficient for exercise testing [Skin Color & Pigmentation] : normal skin color and pigmentation [No Venous Stasis] : no venous stasis [Skin Lesions] : no skin lesions [Oriented To Time, Place, And Person] : oriented to person, place, and time [Impaired Insight] : insight and judgment were intact [Affect] : the affect was normal [Mood] : the mood was normal [FreeTextEntry1] : Negative bruits

## 2021-04-18 NOTE — REVIEW OF SYSTEMS
[Dyspnea on exertion] : dyspnea during exertion [Chest Pain] : chest pain [Fever] : no fever [Chills] : no chills [Shortness Of Breath] : no shortness of breath [Chest  Pressure] : no chest pressure [Lower Ext Edema] : no extremity edema [Leg Claudication] : no intermittent leg claudication [Palpitations] : no palpitations [Cough] : no cough [Abdominal Pain] : no abdominal pain [Nausea] : no nausea [Heartburn] : no heartburn [Dizziness] : no dizziness [Easy Bleeding] : no tendency for easy bleeding [Easy Bruising] : no tendency for easy bruising

## 2021-04-18 NOTE — HISTORY OF PRESENT ILLNESS
[FreeTextEntry1] : 51 year old female with PMHX of HLD here for follow sp recent hospitalization at [facility] March 27 -April 03, 2021 for COVID Pneumonia found to have right lower lobe PE's. Patient was reporting body aches, fever, chills, headaches, mild cough but shortness of breath since 03/22/2021 found to be COVID positive. She felt dizziness with standing on 03/27/2021 and was evaluated and admitted at local hospital. During stay, DDIMER noted elevated. CT of chest with contrast revealed multifocal, atypical COVID pneumonia. ECHO revealed normal wall movement, mild to moderate MR, mild MVP, trace pericardial effusion. She was noted to then have a drop of H/H 7.5/ 26.3 (03/28/2021). She was then treated with 300U of iron infusion then later with 100U. H/H later stabilized.  She has been discharged with plans to continue with Eliquis 5mg for 3 months. \par \par Since discharge she has been slowly trying to increase her exercise tolerance. She now is noting elevated heart rates and some shortness of breath on exertion. She also reports a chest tightness after a day of her trying to exert herself. \par \par She was being followed up by a visiting nurse. Patient was noted to have heart rates at 120 - 125 bpm on 04/12/2021 with associated chest tightness. Patient was urged to go to an emergency room however she declined and ended up going to a local urgent care. EKG done there revealed NSR 77 bpm. \par \par Patient overall feeling well today. She has been approximately 2 weeks symptom free of fever, chills, body aches or cough.  \par \par

## 2021-04-20 LAB
ALBUMIN SERPL ELPH-MCNC: 4.5 G/DL
ALP BLD-CCNC: 58 U/L
ALT SERPL-CCNC: 106 U/L
ANION GAP SERPL CALC-SCNC: 12 MMOL/L
AST SERPL-CCNC: 58 U/L
BASOPHILS # BLD AUTO: 0.04 K/UL
BASOPHILS NFR BLD AUTO: 0.5 %
BILIRUB SERPL-MCNC: 0.2 MG/DL
BUN SERPL-MCNC: 12 MG/DL
CALCIUM SERPL-MCNC: 9.3 MG/DL
CHLORIDE SERPL-SCNC: 107 MMOL/L
CHOLEST SERPL-MCNC: 289 MG/DL
CO2 SERPL-SCNC: 24 MMOL/L
CREAT SERPL-MCNC: 0.64 MG/DL
EOSINOPHIL # BLD AUTO: 0.2 K/UL
EOSINOPHIL NFR BLD AUTO: 2.4 %
ESTIMATED AVERAGE GLUCOSE: 97 MG/DL
FERRITIN SERPL-MCNC: 182 NG/ML
GLUCOSE SERPL-MCNC: 79 MG/DL
HBA1C MFR BLD HPLC: 5 %
HCT VFR BLD CALC: 36.6 %
HDLC SERPL-MCNC: 56 MG/DL
HGB BLD-MCNC: 10.1 G/DL
IMM GRANULOCYTES NFR BLD AUTO: 0.4 %
IRON SATN MFR SERPL: 11 %
IRON SERPL-MCNC: 42 UG/DL
LDLC SERPL CALC-MCNC: 194 MG/DL
LYMPHOCYTES # BLD AUTO: 2.86 K/UL
LYMPHOCYTES NFR BLD AUTO: 33.8 %
MAN DIFF?: NORMAL
MCHC RBC-ENTMCNC: 22.4 PG
MCHC RBC-ENTMCNC: 27.6 GM/DL
MCV RBC AUTO: 81.2 FL
MONOCYTES # BLD AUTO: 0.67 K/UL
MONOCYTES NFR BLD AUTO: 7.9 %
NEUTROPHILS # BLD AUTO: 4.67 K/UL
NEUTROPHILS NFR BLD AUTO: 55 %
NONHDLC SERPL-MCNC: 233 MG/DL
PLATELET # BLD AUTO: 261 K/UL
POTASSIUM SERPL-SCNC: 4.2 MMOL/L
PROT SERPL-MCNC: 6.9 G/DL
RBC # BLD: 4.51 M/UL
RBC # FLD: 26.9 %
SODIUM SERPL-SCNC: 142 MMOL/L
TIBC SERPL-MCNC: 379 UG/DL
TRIGL SERPL-MCNC: 196 MG/DL
TSH SERPL-ACNC: 1.06 UIU/ML
UIBC SERPL-MCNC: 336 UG/DL
WBC # FLD AUTO: 8.47 K/UL

## 2021-04-25 ENCOUNTER — TRANSCRIPTION ENCOUNTER (OUTPATIENT)
Age: 51
End: 2021-04-25

## 2021-04-29 LAB
CORTICOSTEROID BIND GLOBULIN: 2.2 MG/DL
CORTIS SERPL-MCNC: 8.4 UG/DL
CORTISOL, FREE: 0.41 UG/DL
PFCX: 4.8 %

## 2021-05-05 ENCOUNTER — APPOINTMENT (OUTPATIENT)
Dept: HEART AND VASCULAR | Facility: CLINIC | Age: 51
End: 2021-05-05
Payer: COMMERCIAL

## 2021-05-05 DIAGNOSIS — R00.2 PALPITATIONS: ICD-10-CM

## 2021-05-05 PROCEDURE — 99213 OFFICE O/P EST LOW 20 MIN: CPT | Mod: 95

## 2021-05-05 NOTE — HISTORY OF PRESENT ILLNESS
[Home] : at home, [unfilled] , at the time of the visit. [Medical Office: ([facility])___] : at the medical office located in  [Verbal consent obtained from patient] : the patient, [unfilled] [FreeTextEntry1] : The patient is generally improved.  Her blood pressure is 100–110/60 and her pulse 80.  She has occasional palpitations which last for 2 minutes.  She walks 6-7000 steps a day.  She has not had any chest tightness in the last week.  Her period has been heavy and initially made her lightheaded.  She has a history of fibroids.

## 2021-05-05 NOTE — DISCUSSION/SUMMARY
[FreeTextEntry1] : The patient had a recent Covid infection.  She is feeling palpitations.  Monitor showed sinus tachycardia.  This is common after Covid.  She is improving.  She was encouraged to take her iron pill.  She declined intravenous iron at this time.  She will see the gynecologist on May 23.  She will continue vitamin D 50,000 units for 8 weeks.  Her weight is 140 pounds and is stable.

## 2021-05-28 ENCOUNTER — APPOINTMENT (OUTPATIENT)
Dept: HEART AND VASCULAR | Facility: CLINIC | Age: 51
End: 2021-05-28
Payer: COMMERCIAL

## 2021-05-28 VITALS
WEIGHT: 143 LBS | BODY MASS INDEX: 22.98 KG/M2 | DIASTOLIC BLOOD PRESSURE: 68 MMHG | SYSTOLIC BLOOD PRESSURE: 112 MMHG | HEIGHT: 66 IN | HEART RATE: 72 BPM

## 2021-05-28 VITALS — TEMPERATURE: 97 F

## 2021-05-28 DIAGNOSIS — R07.89 OTHER CHEST PAIN: ICD-10-CM

## 2021-05-28 PROCEDURE — 93351 STRESS TTE COMPLETE: CPT

## 2021-05-28 PROCEDURE — ZZZZZ: CPT

## 2021-05-28 PROCEDURE — 99072 ADDL SUPL MATRL&STAF TM PHE: CPT

## 2021-05-28 PROCEDURE — 99214 OFFICE O/P EST MOD 30 MIN: CPT | Mod: 25

## 2021-05-28 RX ORDER — FERROUS SULFATE 325(65) MG
325 (65 FE) TABLET ORAL
Qty: 60 | Refills: 3 | Status: DISCONTINUED | COMMUNITY
End: 2021-05-28

## 2021-05-28 NOTE — HISTORY OF PRESENT ILLNESS
[FreeTextEntry1] : The patient saw hematology and is planning another iron infusion.  She will stop her oral iron.  She has infrequent chest tightness.

## 2021-05-28 NOTE — DISCUSSION/SUMMARY
[FreeTextEntry1] : The patient has atypical chest pain.  The stress EKG was abnormal.  The stress echocardiogram was suboptimal.  The patient is young but has had very high cholesterol.  She is referred for a CAT scan angiogram of her coronaries.  She is concerned about stopping her anticoagulation in 1 month.  She will discuss this with the hematologist.

## 2021-05-30 LAB
25(OH)D3 SERPL-MCNC: 26 NG/ML
ALBUMIN SERPL ELPH-MCNC: 4.6 G/DL
ALP BLD-CCNC: 36 U/L
ALT SERPL-CCNC: 37 U/L
ANION GAP SERPL CALC-SCNC: 15 MMOL/L
AST SERPL-CCNC: 29 U/L
BILIRUB SERPL-MCNC: 0.2 MG/DL
BUN SERPL-MCNC: 8 MG/DL
CALCIUM SERPL-MCNC: 9.5 MG/DL
CHLORIDE SERPL-SCNC: 106 MMOL/L
CO2 SERPL-SCNC: 20 MMOL/L
CREAT SERPL-MCNC: 0.73 MG/DL
FERRITIN SERPL-MCNC: 6 NG/ML
GLUCOSE SERPL-MCNC: 89 MG/DL
HCT VFR BLD CALC: 32.6 %
HGB BLD-MCNC: 9.6 G/DL
IRON SATN MFR SERPL: 3 %
IRON SERPL-MCNC: 14 UG/DL
POTASSIUM SERPL-SCNC: 4.7 MMOL/L
PROT SERPL-MCNC: 6.8 G/DL
SODIUM SERPL-SCNC: 140 MMOL/L
TIBC SERPL-MCNC: 413 UG/DL
UIBC SERPL-MCNC: 399 UG/DL

## 2021-05-30 RX ORDER — CHOLECALCIFEROL (VITAMIN D3) 1250 MCG
1.25 MG CAPSULE ORAL
Qty: 8 | Refills: 3 | Status: DISCONTINUED | COMMUNITY
Start: 2019-04-18 | End: 2021-05-30

## 2021-06-11 ENCOUNTER — OUTPATIENT (OUTPATIENT)
Dept: OUTPATIENT SERVICES | Facility: HOSPITAL | Age: 51
LOS: 1 days | End: 2021-06-11

## 2021-06-11 ENCOUNTER — APPOINTMENT (OUTPATIENT)
Dept: CT IMAGING | Facility: CLINIC | Age: 51
End: 2021-06-11
Payer: SELF-PAY

## 2021-06-11 ENCOUNTER — RESULT REVIEW (OUTPATIENT)
Age: 51
End: 2021-06-11

## 2021-06-11 PROCEDURE — 75571 CT HRT W/O DYE W/CA TEST: CPT | Mod: 26

## 2021-07-30 ENCOUNTER — LABORATORY RESULT (OUTPATIENT)
Age: 51
End: 2021-07-30

## 2021-07-30 ENCOUNTER — NON-APPOINTMENT (OUTPATIENT)
Age: 51
End: 2021-07-30

## 2021-07-30 ENCOUNTER — APPOINTMENT (OUTPATIENT)
Dept: HEART AND VASCULAR | Facility: CLINIC | Age: 51
End: 2021-07-30
Payer: COMMERCIAL

## 2021-07-30 VITALS
HEIGHT: 66 IN | HEART RATE: 75 BPM | WEIGHT: 136 LBS | BODY MASS INDEX: 21.86 KG/M2 | DIASTOLIC BLOOD PRESSURE: 80 MMHG | TEMPERATURE: 98 F | SYSTOLIC BLOOD PRESSURE: 110 MMHG

## 2021-07-30 PROCEDURE — 93306 TTE W/DOPPLER COMPLETE: CPT

## 2021-07-30 PROCEDURE — 99214 OFFICE O/P EST MOD 30 MIN: CPT | Mod: 25

## 2021-07-30 PROCEDURE — 93000 ELECTROCARDIOGRAM COMPLETE: CPT

## 2021-07-30 RX ORDER — APIXABAN 5 MG/1
5 TABLET, FILM COATED ORAL
Qty: 1 | Refills: 1 | Status: DISCONTINUED | COMMUNITY
End: 2021-07-30

## 2021-07-30 RX ORDER — VITAMIN K2 90 MCG
125 MCG CAPSULE ORAL
Refills: 0 | Status: ACTIVE | COMMUNITY

## 2021-07-30 NOTE — DISCUSSION/SUMMARY
[FreeTextEntry1] : The patient's.  Is improved.  She received 2 iron infusions.  Laboratory studies are repeated.  She will see the neurologist for her leg numbness.  Her dyspnea is improved.  She will attempt to increase her exercise.  The EKG is normal.  The patient has had shortness of breath.  The echocardiogram shows posterior leaflet mitral valve prolapse with an ejection fraction of 60 to 65%.  She has mild to moderate mitral regurgitation which is not hemodynamically significant.  This is new.  No treatment is necessary.

## 2021-07-30 NOTE — HISTORY OF PRESENT ILLNESS
[FreeTextEntry1] : The patient has been on improve diet and lost 7 pounds.  Her period is almost back to normal.  She saw the hematologist and had 2 iron infusions.  She has had left leg numbness which is positional.  She will see the neurologist next week.  She has dyspnea climbing 2-3 flights of stairs.  She is off anticoagulation.  She has not had any chest discomfort.  She walks 6-8000 steps a day.  She is scheduled for mammogram in November.

## 2021-08-02 DIAGNOSIS — E83.39 OTHER DISORDERS OF PHOSPHORUS METABOLISM: ICD-10-CM

## 2021-11-01 ENCOUNTER — NON-APPOINTMENT (OUTPATIENT)
Age: 51
End: 2021-11-01

## 2021-11-01 ENCOUNTER — APPOINTMENT (OUTPATIENT)
Dept: HEART AND VASCULAR | Facility: CLINIC | Age: 51
End: 2021-11-01
Payer: COMMERCIAL

## 2021-11-01 VITALS
HEART RATE: 66 BPM | SYSTOLIC BLOOD PRESSURE: 114 MMHG | BODY MASS INDEX: 21.86 KG/M2 | TEMPERATURE: 98.6 F | DIASTOLIC BLOOD PRESSURE: 72 MMHG | WEIGHT: 136 LBS | HEIGHT: 66 IN

## 2021-11-01 DIAGNOSIS — Z23 ENCOUNTER FOR IMMUNIZATION: ICD-10-CM

## 2021-11-01 PROCEDURE — 90471 IMMUNIZATION ADMIN: CPT

## 2021-11-01 PROCEDURE — 93000 ELECTROCARDIOGRAM COMPLETE: CPT

## 2021-11-01 PROCEDURE — 90715 TDAP VACCINE 7 YRS/> IM: CPT

## 2021-11-01 PROCEDURE — 36415 COLL VENOUS BLD VENIPUNCTURE: CPT

## 2021-11-01 PROCEDURE — 99396 PREV VISIT EST AGE 40-64: CPT | Mod: 25

## 2021-11-01 RX ORDER — METOPROLOL SUCCINATE 100 MG/1
100 TABLET, EXTENDED RELEASE ORAL
Qty: 4 | Refills: 1 | Status: DISCONTINUED | COMMUNITY
Start: 2021-05-28 | End: 2021-11-01

## 2021-11-01 RX ORDER — SODIUM,POTASSIUM PHOSPHATES 280-250MG
280-160-250 POWDER IN PACKET (EA) ORAL
Qty: 14 | Refills: 2 | Status: DISCONTINUED | COMMUNITY
Start: 2021-08-02 | End: 2021-11-01

## 2021-11-01 RX ORDER — CHLORHEXIDINE GLUCONATE 4 %
1000 LIQUID (ML) TOPICAL
Refills: 0 | Status: DISCONTINUED | COMMUNITY
End: 2021-11-01

## 2021-11-02 LAB
25(OH)D3 SERPL-MCNC: 24.6 NG/ML
ALBUMIN SERPL ELPH-MCNC: 4.7 G/DL
ALP BLD-CCNC: 40 U/L
ALT SERPL-CCNC: 12 U/L
ANION GAP SERPL CALC-SCNC: 11 MMOL/L
AST SERPL-CCNC: 17 U/L
BASOPHILS # BLD AUTO: 0.02 K/UL
BASOPHILS NFR BLD AUTO: 0.4 %
BILIRUB DIRECT SERPL-MCNC: 0 MG/DL
BILIRUB INDIRECT SERPL-MCNC: 0.2 MG/DL
BILIRUB SERPL-MCNC: 0.2 MG/DL
BUN SERPL-MCNC: 17 MG/DL
CALCIUM SERPL-MCNC: 9.3 MG/DL
CHLORIDE SERPL-SCNC: 106 MMOL/L
CHOLEST SERPL-MCNC: 235 MG/DL
CO2 SERPL-SCNC: 24 MMOL/L
CREAT SERPL-MCNC: 0.83 MG/DL
EOSINOPHIL # BLD AUTO: 0.04 K/UL
EOSINOPHIL NFR BLD AUTO: 0.8 %
ESTIMATED AVERAGE GLUCOSE: 91 MG/DL
FERRITIN SERPL-MCNC: 112 NG/ML
FOLATE SERPL-MCNC: 8.6 NG/ML
GLUCOSE SERPL-MCNC: 94 MG/DL
HBA1C MFR BLD HPLC: 4.8 %
HCT VFR BLD CALC: 36.3 %
HDLC SERPL-MCNC: 63 MG/DL
HGB BLD-MCNC: 11.3 G/DL
IMM GRANULOCYTES NFR BLD AUTO: 0.2 %
IRON SATN MFR SERPL: 25 %
IRON SERPL-MCNC: 74 UG/DL
LDLC SERPL CALC-MCNC: 158 MG/DL
LYMPHOCYTES # BLD AUTO: 1.76 K/UL
LYMPHOCYTES NFR BLD AUTO: 34.1 %
MAN DIFF?: NORMAL
MCHC RBC-ENTMCNC: 31 PG
MCHC RBC-ENTMCNC: 31.1 GM/DL
MCV RBC AUTO: 99.7 FL
MONOCYTES # BLD AUTO: 0.4 K/UL
MONOCYTES NFR BLD AUTO: 7.8 %
NEUTROPHILS # BLD AUTO: 2.93 K/UL
NEUTROPHILS NFR BLD AUTO: 56.7 %
NONHDLC SERPL-MCNC: 172 MG/DL
PHOSPHATE SERPL-MCNC: 3.1 MG/DL
PLATELET # BLD AUTO: 313 K/UL
POTASSIUM SERPL-SCNC: 4.7 MMOL/L
PROT SERPL-MCNC: 6.7 G/DL
RBC # BLD: 3.64 M/UL
RBC # FLD: 13.4 %
SODIUM SERPL-SCNC: 141 MMOL/L
TIBC SERPL-MCNC: 298 UG/DL
TRIGL SERPL-MCNC: 69 MG/DL
TSH SERPL-ACNC: 1.12 UIU/ML
UIBC SERPL-MCNC: 224 UG/DL
VIT B12 SERPL-MCNC: 348 PG/ML
WBC # FLD AUTO: 5.16 K/UL

## 2021-11-02 NOTE — HISTORY OF PRESENT ILLNESS
[FreeTextEntry1] : The patient received 4 doses of intravenous iron.  She is not tired.  She walks 14,000 steps a day.  She plans a mammogram in 1 week and had a Pap smear last year.  She had an eye exam over the summer and has never had colonoscopy.  She has not had the shingles vaccine and declines the flu vaccine.  She had back pain and was seen by the neurologist and had an MRI.

## 2021-11-02 NOTE — PHYSICAL EXAM
[Well Developed] : well developed [Well Nourished] : well nourished [No Acute Distress] : no acute distress [Normal Conjunctiva] : normal conjunctiva [Normal Venous Pressure] : normal venous pressure [No Carotid Bruit] : no carotid bruit [Normal S1, S2] : normal S1, S2 [No Murmur] : no murmur [No Rub] : no rub [No Gallop] : no gallop [Clear Lung Fields] : clear lung fields [Good Air Entry] : good air entry [No Respiratory Distress] : no respiratory distress  [Soft] : abdomen soft [Non Tender] : non-tender [No Masses/organomegaly] : no masses/organomegaly [Normal Bowel Sounds] : normal bowel sounds [Normal Gait] : normal gait [No Edema] : no edema [No Cyanosis] : no cyanosis [No Clubbing] : no clubbing [No Varicosities] : no varicosities [No Rash] : no rash [No Skin Lesions] : no skin lesions [Moves all extremities] : moves all extremities [No Focal Deficits] : no focal deficits [Normal Speech] : normal speech [Alert and Oriented] : alert and oriented [Normal memory] : normal memory [de-identified] :  Rectal exam refused  [de-identified] : Breast exam refused

## 2021-11-02 NOTE — DISCUSSION/SUMMARY
[FreeTextEntry1] : The patient feels improved after her iron infusions.  She is physically active.  She will get a mammogram.  She will get Cologuard.  She declines the shingles and the flu vaccine.  EKG done for health maintenance is normal.  She will follow up with a neurologist.  The patient will contact me what the names of her other providers. She is not having any cognitive impairment. She has no significant current or past depression. She is fully functional and there are no safety issues. She will be screened for eye examination, Colonoscopy,  Pap smear, Bone density, Mammogram and Immunizations over the next 10 years. The patient was furnished with personalized health advice and does not need referral to health education or preventative counseling services. Patient does not need advanced care planning services.

## 2022-05-05 ENCOUNTER — NON-APPOINTMENT (OUTPATIENT)
Age: 52
End: 2022-05-05

## 2022-05-05 ENCOUNTER — APPOINTMENT (OUTPATIENT)
Dept: HEART AND VASCULAR | Facility: CLINIC | Age: 52
End: 2022-05-05
Payer: COMMERCIAL

## 2022-05-05 VITALS
WEIGHT: 142 LBS | HEIGHT: 66 IN | OXYGEN SATURATION: 98 % | BODY MASS INDEX: 22.82 KG/M2 | HEART RATE: 64 BPM | SYSTOLIC BLOOD PRESSURE: 109 MMHG | TEMPERATURE: 97.4 F | DIASTOLIC BLOOD PRESSURE: 73 MMHG

## 2022-05-05 PROCEDURE — 99213 OFFICE O/P EST LOW 20 MIN: CPT | Mod: 25

## 2022-05-05 PROCEDURE — 36415 COLL VENOUS BLD VENIPUNCTURE: CPT

## 2022-05-05 PROCEDURE — 93000 ELECTROCARDIOGRAM COMPLETE: CPT

## 2022-05-05 NOTE — DISCUSSION/SUMMARY
[FreeTextEntry1] : The patient feels that she has knee swelling but has no pain.  This has been thoroughly evaluated.  She has a history of iron deficiency anemia with iron infusion.  Laboratory studies were done.  She has hypercholesterolemia.  She is on a good diet.She will continue her vitamin D.

## 2022-05-05 NOTE — HISTORY OF PRESENT ILLNESS
[FreeTextEntry1] : Patient will 2 to 3 miles.  She has no difficulty with stairs.  She has been feeling fatigued.  Her diet is good.  Her last mammogram was good.  This was last year.  She is using intermittent fasting.  She has had left knee pain and seen the orthopedist neurologist and vascular specialist for it.  She was told of mild meniscus tear with no treatment.  She has no pain but does note swelling.  She is not taking iron.  Her periods are not heavy.

## 2022-05-06 LAB
ANION GAP SERPL CALC-SCNC: 12 MMOL/L
BUN SERPL-MCNC: 14 MG/DL
CALCIUM SERPL-MCNC: 9.4 MG/DL
CHLORIDE SERPL-SCNC: 106 MMOL/L
CO2 SERPL-SCNC: 24 MMOL/L
CREAT SERPL-MCNC: 0.75 MG/DL
EGFR: 96 ML/MIN/1.73M2
FERRITIN SERPL-MCNC: 6 NG/ML
GLUCOSE SERPL-MCNC: 94 MG/DL
HCT VFR BLD CALC: 39.7 %
HGB BLD-MCNC: 11.9 G/DL
IRON SATN MFR SERPL: 12 %
IRON SERPL-MCNC: 54 UG/DL
POTASSIUM SERPL-SCNC: 4.7 MMOL/L
SODIUM SERPL-SCNC: 143 MMOL/L
TIBC SERPL-MCNC: 441 UG/DL
UIBC SERPL-MCNC: 387 UG/DL

## 2022-11-07 ENCOUNTER — APPOINTMENT (OUTPATIENT)
Dept: HEART AND VASCULAR | Facility: CLINIC | Age: 52
End: 2022-11-07

## 2023-01-31 ENCOUNTER — APPOINTMENT (OUTPATIENT)
Dept: HEART AND VASCULAR | Facility: CLINIC | Age: 53
End: 2023-01-31
Payer: COMMERCIAL

## 2023-01-31 VITALS
BODY MASS INDEX: 23.3 KG/M2 | HEART RATE: 66 BPM | WEIGHT: 145 LBS | HEIGHT: 66 IN | DIASTOLIC BLOOD PRESSURE: 73 MMHG | TEMPERATURE: 96.2 F | SYSTOLIC BLOOD PRESSURE: 114 MMHG | OXYGEN SATURATION: 97 %

## 2023-01-31 DIAGNOSIS — R92.8 OTHER ABNORMAL AND INCONCLUSIVE FINDINGS ON DIAGNOSTIC IMAGING OF BREAST: ICD-10-CM

## 2023-01-31 PROCEDURE — 36415 COLL VENOUS BLD VENIPUNCTURE: CPT

## 2023-01-31 PROCEDURE — 99396 PREV VISIT EST AGE 40-64: CPT | Mod: 25

## 2023-01-31 PROCEDURE — 93000 ELECTROCARDIOGRAM COMPLETE: CPT

## 2023-01-31 PROCEDURE — 93306 TTE W/DOPPLER COMPLETE: CPT

## 2023-02-01 DIAGNOSIS — Z53.20 PROCEDURE AND TREATMENT NOT CARRIED OUT BECAUSE OF PATIENT'S DECISION FOR UNSPECIFIED REASONS: ICD-10-CM

## 2023-02-01 PROBLEM — R92.8 MAMMOGRAM ABNORMAL: Status: ACTIVE | Noted: 2018-06-15

## 2023-02-01 LAB
ALBUMIN SERPL ELPH-MCNC: 4.6 G/DL
ALP BLD-CCNC: 42 U/L
ALT SERPL-CCNC: 12 U/L
ANION GAP SERPL CALC-SCNC: 10 MMOL/L
AST SERPL-CCNC: 15 U/L
BASOPHILS # BLD AUTO: 0.04 K/UL
BASOPHILS NFR BLD AUTO: 0.8 %
BILIRUB DIRECT SERPL-MCNC: 0.1 MG/DL
BILIRUB INDIRECT SERPL-MCNC: 0.3 MG/DL
BILIRUB SERPL-MCNC: 0.4 MG/DL
BUN SERPL-MCNC: 19 MG/DL
CALCIUM SERPL-MCNC: 9.8 MG/DL
CHLORIDE SERPL-SCNC: 104 MMOL/L
CHOLEST SERPL-MCNC: 297 MG/DL
CO2 SERPL-SCNC: 27 MMOL/L
CREAT SERPL-MCNC: 0.8 MG/DL
EGFR: 89 ML/MIN/1.73M2
EOSINOPHIL # BLD AUTO: 0.16 K/UL
EOSINOPHIL NFR BLD AUTO: 3.2 %
ESTIMATED AVERAGE GLUCOSE: 103 MG/DL
GLUCOSE SERPL-MCNC: 93 MG/DL
HBA1C MFR BLD HPLC: 5.2 %
HCT VFR BLD CALC: 39.8 %
HDLC SERPL-MCNC: 77 MG/DL
HGB BLD-MCNC: 12.8 G/DL
IMM GRANULOCYTES NFR BLD AUTO: 0.2 %
LDLC SERPL CALC-MCNC: 210 MG/DL
LYMPHOCYTES # BLD AUTO: 2.12 K/UL
LYMPHOCYTES NFR BLD AUTO: 42.4 %
MAN DIFF?: NORMAL
MCHC RBC-ENTMCNC: 29.3 PG
MCHC RBC-ENTMCNC: 32.2 GM/DL
MCV RBC AUTO: 91.1 FL
MONOCYTES # BLD AUTO: 0.4 K/UL
MONOCYTES NFR BLD AUTO: 8 %
NEUTROPHILS # BLD AUTO: 2.27 K/UL
NEUTROPHILS NFR BLD AUTO: 45.4 %
NONHDLC SERPL-MCNC: 220 MG/DL
PLATELET # BLD AUTO: 208 K/UL
POTASSIUM SERPL-SCNC: 5 MMOL/L
PROT SERPL-MCNC: 6.8 G/DL
RBC # BLD: 4.37 M/UL
RBC # FLD: 14.4 %
SODIUM SERPL-SCNC: 141 MMOL/L
TRIGL SERPL-MCNC: 48 MG/DL
TSH SERPL-ACNC: 1.21 UIU/ML
WBC # FLD AUTO: 5 K/UL

## 2023-02-01 NOTE — PHYSICAL EXAM
[Well Developed] : well developed [Well Nourished] : well nourished [No Acute Distress] : no acute distress [Normal Venous Pressure] : normal venous pressure [Normal S1, S2] : normal S1, S2 [No Murmur] : no murmur [Clear Lung Fields] : clear lung fields [Good Air Entry] : good air entry [No Respiratory Distress] : no respiratory distress  [Normal Gait] : normal gait [No Edema] : no edema [Soft] : abdomen soft [Non Tender] : non-tender [Moves all extremities] : moves all extremities [No Focal Deficits] : no focal deficits [Alert and Oriented] : alert and oriented [Normal memory] : normal memory [de-identified] : clear tms, no cervical lymphadenopathy

## 2023-02-01 NOTE — HISTORY OF PRESENT ILLNESS
Rectal bleeding for 3-6 months off and on. Abdominal bloating
[FreeTextEntry1] : 52 year old female, with PMHX of HLD, MVP, Iron Def Anemia and FMHX of CAD ( Father MI 55 ) here for annual physical exam. \par \par Since last visit, she overall is feeling good. She is trying to be active, she walks up 10,000 steps. She can walk up 5-6 flights before getting out of breath. She reports no chest pains.\par \par She has been following up with her GYN regularly. She feels her menstrual cycles are decreasing. She no longer has episodes of light headeness in the setting of her anemia. She has general fatigue now. She sleeps well. She reports no current bleeding.\par \par No recent illness. \par \par Annual Physical\par EYE: Up to date. She has been requested to used glasses for reading. \par Mammogram: Up to date, last done 11/2022, reports no change\par Pap Smear: Up to date. 3 years ago scheduled for q 5 years\par Colonoscopy: Colorguard negative 7/2022, Next scheduled 2025\par Immunizations: COVID only 1 booster, defers influenza, Tetanus up to date 2021, [place] currently defers

## 2023-02-01 NOTE — REVIEW OF SYSTEMS
[Heartburn] : heartburn [Fever] : no fever [Chills] : no chills [SOB] : no shortness of breath [Dyspnea on exertion] : not dyspnea during exertion [Chest Discomfort] : no chest discomfort [Lower Ext Edema] : no extremity edema [Palpitations] : no palpitations [Orthopnea] : no orthopnea [PND] : no PND [Cough] : no cough [Abdominal Pain] : no abdominal pain [Nausea] : no nausea [Vomiting] : no vomiting [Diarrhea] : diarrhea [Dysuria] : no dysuria [Dizziness] : no dizziness [Numbness (Hypoesthesia)] : no numbness [Weakness] : no weakness [Speech Disturbance] : no speech disturbance [Easy Bleeding] : no tendency for easy bleeding

## 2023-02-01 NOTE — DISCUSSION/SUMMARY
[FreeTextEntry1] : 52 year old female, with PMHX of HLD, MVP, Iron Def Anemia and FMHX of CAD ( Father MI 55 ) here for annual physical exam. \par \par CVD Risk: Currently asymptomatic. EKG NSR 66bpm. Last Stress ECHO abnormal, EKG changes noted with the inability to view apex. She did have a following CA Score of zero (6/2021). She no longer is reporting any chest pains. Significant CAD not likely. Continue with optimization of risk factors.\par MVP with MOD MR: Asymptomatic. Echocardiogram today revealed no significant change. \par HLD: Fasting labs sent today. LDL goal < 100.\par Anemia: H/H and iron studies sent. \par \par Follow up in 6 months.\par \par I have discussed the case with MARGARITA Sherwood. I have personally performed a history, physical exam, and my own medical decision making. I have reviewed the note and agree with the findings and plan. \par \par The patient will contact me what the names of her other providers. She is not having any cognitive impairment. She has no significant current or past depression. She is fully functional and there are no safety issues. She will be screened for eye examination, Colonoscopy,  Pap smear, Bone density, Mammogram and Immunizations over the next 10 years. The patient was furnished with personalized health advice and does not need referral to health education or preventative counseling services. Patient does not need advanced care planning services.  Laboratory testing was done.\par  [EKG obtained to assist in diagnosis and management of assessed problem(s)] : EKG obtained to assist in diagnosis and management of assessed problem(s)

## 2023-02-03 LAB
FERRITIN SERPL-MCNC: 20 NG/ML
IRON SATN MFR SERPL: 21 %
IRON SERPL-MCNC: 77 UG/DL
TIBC SERPL-MCNC: 370 UG/DL
UIBC SERPL-MCNC: 293 UG/DL

## 2023-05-17 ENCOUNTER — APPOINTMENT (OUTPATIENT)
Dept: HEART AND VASCULAR | Facility: CLINIC | Age: 53
End: 2023-05-17
Payer: COMMERCIAL

## 2023-05-17 PROCEDURE — 36415 COLL VENOUS BLD VENIPUNCTURE: CPT

## 2023-05-18 LAB
ANION GAP SERPL CALC-SCNC: 14 MMOL/L
BASOPHILS # BLD AUTO: 0.04 K/UL
BASOPHILS NFR BLD AUTO: 0.8 %
BUN SERPL-MCNC: 13 MG/DL
CALCIUM SERPL-MCNC: 10.1 MG/DL
CHLORIDE SERPL-SCNC: 106 MMOL/L
CHOLEST SERPL-MCNC: 246 MG/DL
CO2 SERPL-SCNC: 24 MMOL/L
CREAT SERPL-MCNC: 0.82 MG/DL
EGFR: 85 ML/MIN/1.73M2
EOSINOPHIL # BLD AUTO: 0.16 K/UL
EOSINOPHIL NFR BLD AUTO: 3.1 %
ESTIMATED AVERAGE GLUCOSE: 103 MG/DL
GLUCOSE SERPL-MCNC: 87 MG/DL
HBA1C MFR BLD HPLC: 5.2 %
HCT VFR BLD CALC: 43.8 %
HDLC SERPL-MCNC: 80 MG/DL
HGB BLD-MCNC: 14.5 G/DL
IMM GRANULOCYTES NFR BLD AUTO: 0 %
LDLC SERPL CALC-MCNC: 152 MG/DL
LYMPHOCYTES # BLD AUTO: 1.91 K/UL
LYMPHOCYTES NFR BLD AUTO: 37.5 %
MAN DIFF?: NORMAL
MCHC RBC-ENTMCNC: 31.3 PG
MCHC RBC-ENTMCNC: 33.1 GM/DL
MCV RBC AUTO: 94.6 FL
MONOCYTES # BLD AUTO: 0.45 K/UL
MONOCYTES NFR BLD AUTO: 8.8 %
NEUTROPHILS # BLD AUTO: 2.54 K/UL
NEUTROPHILS NFR BLD AUTO: 49.8 %
NONHDLC SERPL-MCNC: 166 MG/DL
PLATELET # BLD AUTO: 222 K/UL
POTASSIUM SERPL-SCNC: 5 MMOL/L
RBC # BLD: 4.63 M/UL
RBC # FLD: 12.8 %
SODIUM SERPL-SCNC: 144 MMOL/L
TRIGL SERPL-MCNC: 72 MG/DL
WBC # FLD AUTO: 5.1 K/UL

## 2023-06-12 ENCOUNTER — NON-APPOINTMENT (OUTPATIENT)
Age: 53
End: 2023-06-12

## 2023-06-19 ENCOUNTER — NON-APPOINTMENT (OUTPATIENT)
Age: 53
End: 2023-06-19

## 2023-06-19 ENCOUNTER — APPOINTMENT (OUTPATIENT)
Dept: HEART AND VASCULAR | Facility: CLINIC | Age: 53
End: 2023-06-19
Payer: COMMERCIAL

## 2023-06-19 VITALS
TEMPERATURE: 96.6 F | DIASTOLIC BLOOD PRESSURE: 70 MMHG | BODY MASS INDEX: 20.57 KG/M2 | HEIGHT: 66 IN | OXYGEN SATURATION: 97 % | HEART RATE: 66 BPM | SYSTOLIC BLOOD PRESSURE: 112 MMHG | WEIGHT: 128 LBS

## 2023-06-19 DIAGNOSIS — U07.1 COVID-19: ICD-10-CM

## 2023-06-19 PROCEDURE — 93000 ELECTROCARDIOGRAM COMPLETE: CPT

## 2023-06-19 PROCEDURE — 99213 OFFICE O/P EST LOW 20 MIN: CPT | Mod: 25

## 2023-06-19 RX ORDER — NIRMATRELVIR AND RITONAVIR 300-100 MG
20 X 150 MG & KIT ORAL
Qty: 30 | Refills: 0 | Status: COMPLETED | COMMUNITY
Start: 2023-06-10

## 2023-06-19 RX ORDER — COVID-19 MOLECULAR TEST ASSAY
KIT MISCELLANEOUS
Qty: 8 | Refills: 0 | Status: COMPLETED | COMMUNITY
Start: 2023-03-22

## 2023-06-19 RX ORDER — DOXYCYCLINE HYCLATE 100 MG/1
100 CAPSULE ORAL
Qty: 14 | Refills: 0 | Status: DISCONTINUED | COMMUNITY
Start: 2023-04-27

## 2023-06-19 NOTE — HISTORY OF PRESENT ILLNESS
[FreeTextEntry1] : 53 year old female, with PMHX of HLD, MVP with mild to mod MR, Iron Def Anemia PE ( 03/2021 ) and  and FMHX of CAD ( Father MI 55 ) here for a follow up\par \par Patient is concerned over mass of right upper quadrant of her abdomen.  \par \par Since last visit, patient was positive covid + on rapid test during urgent care visit 10 days prior. Patient had headaches, body aches, fatigue and sore throat and was given Paxlovid. Symptoms resolved 3 days later however she started noticing a mass of RUQ with mild tenderness. Since the completion of Paxlovid she feels the the mass has seems to have resolved. No nausea, vomiting or diarrhea. She had one episode of difficulty passing stool but that passed next day. \par \par She did return to the urgent care and was told it possibly might by a lipoma.  She also scheduled an RUQ ultrasound. \par \par Patient also started taking ASA 81 mg in fear of having another clot. She had the urgent care draw another DDIMER, negative. \par \par In this time period, she does remain to stay active. She does admit to getting our of breath with waking up 3 flights of stairs but does not stop. She has no chest pains, edema, orthopnea or palpitations. She feels this is similar to when she had COVID back in 2021. \par \par Patient also has adjusted her weight and has lost about 12 lbs in a year. \par \par Patient [place] out of the [place] - [place]09[place]2023\par \par Previous Work Up\par Annual 01/2023\par LABS ( 05/18/2023 ) Total Chol 246, HDL 80, \par ECHO ( 01/2023 ) EF 60%, MVP with mild to mod MR, normal wall motion\par Stress Echo ( 05/28/2021 )  Exercised for 10:27 min, 12 METS, EKG portion normal with echocardiogram portion normal but unable to see apex\par CA Score ( 06/11/2021 ) zero

## 2023-06-19 NOTE — PHYSICAL EXAM
[Well Developed] : well developed [Well Nourished] : well nourished [No Acute Distress] : no acute distress [No Carotid Bruit] : no carotid bruit [Normal S1, S2] : normal S1, S2 [No Murmur] : no murmur [Clear Lung Fields] : clear lung fields [Good Air Entry] : good air entry [No Respiratory Distress] : no respiratory distress  [Soft] : abdomen soft [Non Tender] : non-tender [No Edema] : no edema [Moves all extremities] : moves all extremities [No Focal Deficits] : no focal deficits [Alert and Oriented] : alert and oriented [de-identified] : 2cm x 1.5cm soft, mobile, non tender mass noted RUQ of abd

## 2023-06-19 NOTE — REVIEW OF SYSTEMS
[Fever] : no fever [Chills] : no chills [SOB] : no shortness of breath [Dyspnea on exertion] : not dyspnea during exertion [Chest Discomfort] : no chest discomfort [Palpitations] : no palpitations [Syncope] : no syncope [Abdominal Pain] : no abdominal pain [Nausea] : no nausea [Vomiting] : no vomiting [Change In The Stool] : no change in stool [Dysphagia] : no dysphagia [Diarrhea] : diarrhea [Constipation] : no constipation [Blood in Stool] : no blood in stool [Dysuria] : no dysuria [Dizziness] : no dizziness [Numbness (Hypoesthesia)] : no numbness [Weakness] : no weakness [Speech Disturbance] : no speech disturbance [Easy Bleeding] : no tendency for easy bleeding [FreeTextEntry7] : mass

## 2023-06-19 NOTE — DISCUSSION/SUMMARY
[FreeTextEntry1] : 53 year old female, with PMHX of HLD, MVP with mild to mod MR, Iron Def Anemia PE ( 03/2021 ) and  and FMHX of CAD ( Father MI 55 ) here for a follow up\par \par subcutaneous: Small, mobile, nontender nodule. Will send for RUQ sonogram to assess. \par CVD Risk: Some dyspnea with no chest pain. EKG NSR 66bpm, no change. Last Stress ECHO abnormal, EKG changes noted on stress test with the inability to view apex. She did have a following CA Score of zero (6/2021).  Significant CAD not likely. Continue with optimization of risk factors.\par MVP with MOD MR: Asymptomatic. Last echocardiogram stable. \par HLD: LDL goal < 100. Currently at 152 with ASCVD score of 1.08%. Continue with diet and exercise. \par \par Follow up in 6 months.\par \par I have discussed the case with MARGARITA Sherwood. I have personally performed a history, physical exam, and my own medical decision making. I have reviewed the note and agree with the findings and plan. \par \par The patient is recovering from COVID.  She has a cyst/lipoma.  [EKG obtained to assist in diagnosis and management of assessed problem(s)] : EKG obtained to assist in diagnosis and management of assessed problem(s)

## 2023-11-23 DIAGNOSIS — R93.1 ABNORMAL FINDINGS ON DIAGNOSTIC IMAGING OF HEART AND CORONARY CIRCULATION: ICD-10-CM

## 2023-12-12 ENCOUNTER — APPOINTMENT (OUTPATIENT)
Dept: HEART AND VASCULAR | Facility: CLINIC | Age: 53
End: 2023-12-12
Payer: COMMERCIAL

## 2023-12-12 VITALS
WEIGHT: 132 LBS | DIASTOLIC BLOOD PRESSURE: 78 MMHG | HEART RATE: 60 BPM | SYSTOLIC BLOOD PRESSURE: 114 MMHG | TEMPERATURE: 97.6 F | HEIGHT: 66 IN | OXYGEN SATURATION: 98 % | BODY MASS INDEX: 21.21 KG/M2

## 2023-12-12 DIAGNOSIS — K21.9 GASTRO-ESOPHAGEAL REFLUX DISEASE W/OUT ESOPHAGITIS: ICD-10-CM

## 2023-12-12 PROCEDURE — 99215 OFFICE O/P EST HI 40 MIN: CPT | Mod: 25

## 2023-12-12 PROCEDURE — 93000 ELECTROCARDIOGRAM COMPLETE: CPT

## 2023-12-12 PROCEDURE — 36415 COLL VENOUS BLD VENIPUNCTURE: CPT

## 2023-12-12 RX ORDER — ASPIRIN 81 MG
81 TABLET, DELAYED RELEASE (ENTERIC COATED) ORAL
Refills: 0 | Status: COMPLETED | COMMUNITY
End: 2023-12-12

## 2023-12-13 NOTE — REVIEW OF SYSTEMS
[Fever] : no fever [Chills] : no chills [Discharge From Ears] : no discharge from the ears [Dyspnea on exertion] : not dyspnea during exertion [Lower Ext Edema] : no extremity edema [Palpitations] : no palpitations [Orthopnea] : no orthopnea [PND] : no PND [Nausea] : no nausea [Vomiting] : no vomiting [Diarrhea] : diarrhea [Blood in Stool] : no blood in stool [Dizziness] : no dizziness [Numbness (Hypoesthesia)] : no numbness [Weakness] : no weakness [Speech Disturbance] : no speech disturbance [Easy Bleeding] : no tendency for easy bleeding

## 2023-12-13 NOTE — DISCUSSION/SUMMARY
[FreeTextEntry1] : 53 year old female, with PMHX of HLD, MVP with mild to mod MR, Iron Def Anemia PE (03/2021) and FMHX of CAD ( Father MI 55 ) here for follow up.   subcutaneous mass: Increased in size noted. Patient would like intervention. Referred to general surgery Epigastric Pain: Mild discomfort with no associated nausea or vomiting. Recommended PPI however she deferred at this time. She will restrict stressors.  CVD Risk: Non exertional chest pains. EKG NSR 73 bpm, no change. Doubt significant CAD however with get stress echocardiogram to assess. Continue with optimization of risk factors. HLD: LDL goal < 100. Currently at 152 with ASCVD score of 1.08%. Continue with diet and exercise.  Edema: Trace. Patient reports no symptoms. She defers venous ultrasound at this time.   Return for a stress echocardiogram.  I, Dr. Alicja Giles personally performed the evaluation and management services for this patient who presents today with a changed problem.  The evaluation and management includes conducting the examination assessing all conditions and establishing a new plan of care.  Today my ACP Samira Sherwood was here and recorded the evaluation and management services.       The subcutaneous mass has increased in size.  The patient will see Dr. James.  She has symptoms typical of reflux.  She declines medication.  She is improved off caffeine and alcohol.  She will see Dr. Parker.  Patient has a probable hematoma on her right breast.  She recently had a mammogram.  She will let us know if this does not resolve.  Patient has a history of left-sided edema.  This has resolved.  He declines a venous Doppler.  She has had left-sided chest tightness and soreness which is diffuse.  Her EKG is normal.  She has tenderness.  It last for 10 minutes.  She will return for stress test.   [EKG obtained to assist in diagnosis and management of assessed problem(s)] : EKG obtained to assist in diagnosis and management of assessed problem(s)

## 2023-12-13 NOTE — HISTORY OF PRESENT ILLNESS
[FreeTextEntry1] : 53 year old female, with PMHX of HLD, MVP with mild to mod MR, Iron Def Anemia PE (03/2021) and FMHX of CAD ( Father MI 55 ) here for follow up.   Since last visit, she feels her right upper discomfort is still present. She had a RUQ US done at [facility] revealing 2.3x1.1x2cm solid mass on 06/21/2023. She later had a follow up RUQ US done10/27/2023 at another hospital revealing the mass at 3.9cm. She reports no dysphagia, nausea or vomiting. She adds she recently is noticing some epigastric discomfort. She denies any burping, acid taste, cough or radiation up her mid sternum. She stopped her ETOH and coffee intake. The symptoms have improved.   She is also reporting a whole left sided chest ache. This is on and off. It is not exertional. She did have a mammogram 2 weeks ago however she cannot recall if symptoms happened before or after her testing. She stays active by walking and taking stairs. There is no return of chest pains. She reports no dyspnea on exertion.   Patient denies any palpitations, shortness of breath at rest, dizziness, falls, syncope or neuro focal deficits.   She has long standing left knee minor swelling with minor left leg swelling. She reports no changes or no additional symptoms.   Previous Work Up Annual 01/2023 LABS ( 05/18/2023 ) Total Chol 246, HDL 80,   ECHO ( 01/2023 ) EF 60%, MVP with mild to mod MR, normal wall motion  Stress Echo ( 05/28/2021 )  Exercised for 10:27 min, 12 METS, EKG portion normal with echocardiogram portion normal but unable to see apex CA Score ( 06/11/2021 ) zero

## 2023-12-18 ENCOUNTER — TRANSCRIPTION ENCOUNTER (OUTPATIENT)
Age: 53
End: 2023-12-18

## 2023-12-20 LAB
24R-OH-CALCIDIOL SERPL-MCNC: 54.7 PG/ML
ALBUMIN SERPL ELPH-MCNC: 4.7 G/DL
ALP BLD-CCNC: 42 U/L
ALT SERPL-CCNC: 12 U/L
ANION GAP SERPL CALC-SCNC: 11 MMOL/L
AST SERPL-CCNC: 18 U/L
BILIRUB SERPL-MCNC: 0.5 MG/DL
BUN SERPL-MCNC: 14 MG/DL
CALCIUM SERPL-MCNC: 9.7 MG/DL
CHLORIDE SERPL-SCNC: 106 MMOL/L
CHOLEST SERPL-MCNC: 269 MG/DL
CO2 SERPL-SCNC: 26 MMOL/L
CREAT SERPL-MCNC: 0.88 MG/DL
EGFR: 79 ML/MIN/1.73M2
FERRITIN SERPL-MCNC: 68 NG/ML
GLUCOSE SERPL-MCNC: 96 MG/DL
HDLC SERPL-MCNC: 72 MG/DL
IRON SATN MFR SERPL: 30 %
IRON SERPL-MCNC: 99 UG/DL
LDLC SERPL CALC-MCNC: 183 MG/DL
NONHDLC SERPL-MCNC: 196 MG/DL
POTASSIUM SERPL-SCNC: 4.7 MMOL/L
PROT SERPL-MCNC: 6.9 G/DL
SODIUM SERPL-SCNC: 143 MMOL/L
TIBC SERPL-MCNC: 330 UG/DL
TRIGL SERPL-MCNC: 82 MG/DL
UIBC SERPL-MCNC: 231 UG/DL

## 2024-01-22 ENCOUNTER — APPOINTMENT (OUTPATIENT)
Dept: SURGERY | Facility: CLINIC | Age: 54
End: 2024-01-22
Payer: COMMERCIAL

## 2024-01-22 VITALS
BODY MASS INDEX: 21.45 KG/M2 | WEIGHT: 133.44 LBS | HEIGHT: 66 IN | OXYGEN SATURATION: 99 % | SYSTOLIC BLOOD PRESSURE: 127 MMHG | TEMPERATURE: 97.7 F | DIASTOLIC BLOOD PRESSURE: 83 MMHG | HEART RATE: 70 BPM

## 2024-01-22 DIAGNOSIS — I34.0 NONRHEUMATIC MITRAL (VALVE) INSUFFICIENCY: ICD-10-CM

## 2024-01-22 DIAGNOSIS — Z86.711 PERSONAL HISTORY OF PULMONARY EMBOLISM: ICD-10-CM

## 2024-01-22 DIAGNOSIS — Z82.49 FAMILY HISTORY OF ISCHEMIC HEART DISEASE AND OTHER DISEASES OF THE CIRCULATORY SYSTEM: ICD-10-CM

## 2024-01-22 DIAGNOSIS — D50.9 IRON DEFICIENCY ANEMIA, UNSPECIFIED: ICD-10-CM

## 2024-01-22 DIAGNOSIS — Z87.891 PERSONAL HISTORY OF NICOTINE DEPENDENCE: ICD-10-CM

## 2024-01-22 DIAGNOSIS — E78.00 PURE HYPERCHOLESTEROLEMIA, UNSPECIFIED: ICD-10-CM

## 2024-01-22 DIAGNOSIS — Z78.9 OTHER SPECIFIED HEALTH STATUS: ICD-10-CM

## 2024-01-22 DIAGNOSIS — R19.01 RIGHT UPPER QUADRANT ABDOMINAL SWELLING, MASS AND LUMP: ICD-10-CM

## 2024-01-22 PROCEDURE — 99203 OFFICE O/P NEW LOW 30 MIN: CPT

## 2024-01-22 NOTE — PHYSICAL EXAM
[Calm] : calm [de-identified] : NAD, comfortable  [de-identified] : No clubbing, cyanosis, or edema.  [de-identified] : NCAT, no scleral icterus  [de-identified] : Warm, dry.  Right upper quadrant abdominal wall has an approximately 2 cm soft, mobile, non-tender mass that does not change with Valsalva maneuver. [de-identified] : A&Ox3

## 2024-01-22 NOTE — DATA REVIEWED
[FreeTextEntry1] : US soft tissue (6/21/2023) - 2.3 x 1.1 x 2 cm solid mass, may well correspond to a lipoma.  US soft tissue (10/27/2023) - 3.9 cm lipoma in the right upper quadrant.

## 2024-01-22 NOTE — ASSESSMENT
[FreeTextEntry1] : Ms. Fernández is a 53-year-old woman with a right abdominal wall mass.   We discussed the risks, benefits, and alternatives to surgery, including but not limited to bleeding, infection, and recurrence of the mass.  The patient is amenable to proceeding with surgery.  We will plan for excision of the right abdominal wall mass on May 22, 2024, with monitored anesthesia care/sedation and local anesthesia.

## 2024-01-22 NOTE — HISTORY OF PRESENT ILLNESS
[de-identified] : Ms. Fernández presented today for evaluation and management of a mass of the right abdominal wall.  She stated the mass has been present for approximately 6 months.  She noticed the mass herself after she had COVID and experienced abdominal pain and was palpating her abdomen.  She denied significant pain of the mass, although it is uncomfortable and sensitive to pressure.  She also noted generalized abdominal pain.  She stated the mass is probably enlarging.

## 2024-05-22 ENCOUNTER — APPOINTMENT (OUTPATIENT)
Dept: SURGERY | Facility: AMBULATORY SURGERY CENTER | Age: 54
End: 2024-05-22

## 2024-06-07 ENCOUNTER — APPOINTMENT (OUTPATIENT)
Dept: HEART AND VASCULAR | Facility: CLINIC | Age: 54
End: 2024-06-07
Payer: COMMERCIAL

## 2024-06-07 VITALS
SYSTOLIC BLOOD PRESSURE: 106 MMHG | RESPIRATION RATE: 18 BRPM | BODY MASS INDEX: 21.69 KG/M2 | WEIGHT: 135 LBS | HEIGHT: 66 IN | HEART RATE: 83 BPM | DIASTOLIC BLOOD PRESSURE: 70 MMHG

## 2024-06-07 DIAGNOSIS — Z00.00 ENCOUNTER FOR GENERAL ADULT MEDICAL EXAMINATION W/OUT ABNORMAL FINDINGS: ICD-10-CM

## 2024-06-07 DIAGNOSIS — R94.39 ABNORMAL RESULT OF OTHER CARDIOVASCULAR FUNCTION STUDY: ICD-10-CM

## 2024-06-07 DIAGNOSIS — I34.1 NONRHEUMATIC MITRAL (VALVE) PROLAPSE: ICD-10-CM

## 2024-06-07 PROCEDURE — 93351 STRESS TTE COMPLETE: CPT

## 2024-06-07 PROCEDURE — 99396 PREV VISIT EST AGE 40-64: CPT | Mod: 25

## 2024-06-07 PROCEDURE — 36415 COLL VENOUS BLD VENIPUNCTURE: CPT

## 2024-06-07 RX ORDER — BERBERINE CHLOR/SEAWEED/CHROM 500-250 MG
CAPSULE ORAL
Refills: 0 | Status: DISCONTINUED | COMMUNITY
End: 2024-06-07

## 2024-06-07 NOTE — PHYSICAL EXAM
[Well Developed] : well developed [Well Nourished] : well nourished [No Acute Distress] : no acute distress [No Carotid Bruit] : no carotid bruit [Normal S1, S2] : normal S1, S2 [No Murmur] : no murmur [Clear Lung Fields] : clear lung fields [Good Air Entry] : good air entry [No Respiratory Distress] : no respiratory distress  [Soft] : abdomen soft [Non Tender] : non-tender [No Edema] : no edema [Moves all extremities] : moves all extremities [No Focal Deficits] : no focal deficits [Normal Speech] : normal speech [Alert and Oriented] : alert and oriented [Normal memory] : normal memory [de-identified] :  Rectal exam refused  [de-identified] : Breast exam refused

## 2024-06-07 NOTE — DISCUSSION/SUMMARY
[FreeTextEntry1] : The patient had a positive stress EKG.  Today stress echocardiogram is normal.  Significant coronary artery disease is unlikely.  She does not need treatment for her lipoma.  She is scheduled for colonoscopy.  Shingles vaccine was discussed.  The patient will contact me what the names of her other providers. She is not having any cognitive impairment. She has no significant current or past depression. She is fully functional and there are no safety issues. She will be screened for eye examination, Colonoscopy,  Pap smear, Bone density, Mammogram and Immunizations over the next 10 years. The patient was furnished with personalized health advice and does not need referral to health education or preventative counseling services. Patient does not need advanced care planning services.  Laboratory testing was done. [EKG obtained to assist in diagnosis and management of assessed problem(s)] : EKG obtained to assist in diagnosis and management of assessed problem(s)

## 2024-06-07 NOTE — HISTORY OF PRESENT ILLNESS
[FreeTextEntry1] : The patient bicycles and can is without difficulty.  She had GI side effects with Paxlovid.  She saw 2 surgeons for her lipoma and was recommended no surgery.  She is scheduled for colonoscopy.  She will get a mammogram in December and was told the last was okay.  She had an eye exam last year and a recent GYN exam.  She had COVID-vaccine last year.

## 2024-06-09 LAB
25(OH)D3 SERPL-MCNC: 26.2 NG/ML
ALBUMIN SERPL ELPH-MCNC: 4.8 G/DL
ALP BLD-CCNC: 42 U/L
ALT SERPL-CCNC: 14 U/L
ANION GAP SERPL CALC-SCNC: 12 MMOL/L
AST SERPL-CCNC: 17 U/L
BILIRUB DIRECT SERPL-MCNC: 0.1 MG/DL
BILIRUB INDIRECT SERPL-MCNC: 0.3 MG/DL
BILIRUB SERPL-MCNC: 0.4 MG/DL
BUN SERPL-MCNC: 17 MG/DL
CALCIUM SERPL-MCNC: 9.7 MG/DL
CHLORIDE SERPL-SCNC: 104 MMOL/L
CHOLEST SERPL-MCNC: 266 MG/DL
CO2 SERPL-SCNC: 24 MMOL/L
CREAT SERPL-MCNC: 0.74 MG/DL
EGFR: 96 ML/MIN/1.73M2
ESTIMATED AVERAGE GLUCOSE: 105 MG/DL
FERRITIN SERPL-MCNC: 61 NG/ML
GLUCOSE SERPL-MCNC: 95 MG/DL
HBA1C MFR BLD HPLC: 5.3 %
HCT VFR BLD CALC: 40.4 %
HDLC SERPL-MCNC: 77 MG/DL
HGB BLD-MCNC: 12.6 G/DL
IRON SATN MFR SERPL: 22 %
IRON SERPL-MCNC: 78 UG/DL
LDLC SERPL CALC-MCNC: 179 MG/DL
MCHC RBC-ENTMCNC: 29.6 PG
MCHC RBC-ENTMCNC: 31.2 GM/DL
MCV RBC AUTO: 95.1 FL
NONHDLC SERPL-MCNC: 189 MG/DL
PLATELET # BLD AUTO: 219 K/UL
POTASSIUM SERPL-SCNC: 4.5 MMOL/L
PROT SERPL-MCNC: 7.2 G/DL
RBC # BLD: 4.25 M/UL
RBC # FLD: 12.5 %
SODIUM SERPL-SCNC: 140 MMOL/L
TIBC SERPL-MCNC: 362 UG/DL
TRIGL SERPL-MCNC: 62 MG/DL
TSH SERPL-ACNC: 1.25 UIU/ML
UIBC SERPL-MCNC: 284 UG/DL
WBC # FLD AUTO: 5.48 K/UL

## 2024-07-19 ENCOUNTER — APPOINTMENT (OUTPATIENT)
Dept: HEART AND VASCULAR | Facility: CLINIC | Age: 54
End: 2024-07-19
Payer: COMMERCIAL

## 2024-07-19 VITALS
BODY MASS INDEX: 21.69 KG/M2 | OXYGEN SATURATION: 97 % | WEIGHT: 135 LBS | DIASTOLIC BLOOD PRESSURE: 79 MMHG | SYSTOLIC BLOOD PRESSURE: 115 MMHG | HEIGHT: 66 IN | HEART RATE: 65 BPM

## 2024-07-19 DIAGNOSIS — I34.1 NONRHEUMATIC MITRAL (VALVE) PROLAPSE: ICD-10-CM

## 2024-07-19 DIAGNOSIS — I34.0 NONRHEUMATIC MITRAL (VALVE) INSUFFICIENCY: ICD-10-CM

## 2024-07-19 PROCEDURE — 93306 TTE W/DOPPLER COMPLETE: CPT

## 2024-07-21 PROBLEM — I34.0 MODERATE MITRAL REGURGITATION: Status: RESOLVED | Noted: 2023-02-01 | Resolved: 2024-07-21

## 2024-12-10 ENCOUNTER — NON-APPOINTMENT (OUTPATIENT)
Age: 54
End: 2024-12-10

## 2024-12-10 ENCOUNTER — APPOINTMENT (OUTPATIENT)
Dept: HEART AND VASCULAR | Facility: CLINIC | Age: 54
End: 2024-12-10
Payer: COMMERCIAL

## 2024-12-10 VITALS
HEIGHT: 66 IN | DIASTOLIC BLOOD PRESSURE: 72 MMHG | TEMPERATURE: 96 F | OXYGEN SATURATION: 97 % | BODY MASS INDEX: 23.3 KG/M2 | SYSTOLIC BLOOD PRESSURE: 118 MMHG | HEART RATE: 67 BPM | WEIGHT: 145 LBS

## 2024-12-10 DIAGNOSIS — I34.1 NONRHEUMATIC MITRAL (VALVE) PROLAPSE: ICD-10-CM

## 2024-12-10 DIAGNOSIS — I70.0 ATHEROSCLEROSIS OF AORTA: ICD-10-CM

## 2024-12-10 DIAGNOSIS — R93.1 ABNORMAL FINDINGS ON DIAGNOSTIC IMAGING OF HEART AND CORONARY CIRCULATION: ICD-10-CM

## 2024-12-10 DIAGNOSIS — E78.00 PURE HYPERCHOLESTEROLEMIA, UNSPECIFIED: ICD-10-CM

## 2024-12-10 PROCEDURE — 93000 ELECTROCARDIOGRAM COMPLETE: CPT

## 2024-12-10 PROCEDURE — 99214 OFFICE O/P EST MOD 30 MIN: CPT | Mod: 25

## 2025-04-09 DIAGNOSIS — R73.9 HYPERGLYCEMIA, UNSPECIFIED: ICD-10-CM

## 2025-07-14 ENCOUNTER — LABORATORY RESULT (OUTPATIENT)
Age: 55
End: 2025-07-14

## 2025-07-15 PROBLEM — Z79.899 MEDICATION MANAGEMENT: Status: ACTIVE | Noted: 2025-07-15

## 2025-07-22 ENCOUNTER — APPOINTMENT (OUTPATIENT)
Dept: HEART AND VASCULAR | Facility: CLINIC | Age: 55
End: 2025-07-22
Payer: COMMERCIAL

## 2025-07-22 VITALS
SYSTOLIC BLOOD PRESSURE: 110 MMHG | HEART RATE: 71 BPM | BODY MASS INDEX: 21.86 KG/M2 | WEIGHT: 136 LBS | DIASTOLIC BLOOD PRESSURE: 64 MMHG | HEIGHT: 66 IN | OXYGEN SATURATION: 98 %

## 2025-07-22 DIAGNOSIS — Z00.00 ENCOUNTER FOR GENERAL ADULT MEDICAL EXAMINATION W/OUT ABNORMAL FINDINGS: ICD-10-CM

## 2025-07-22 DIAGNOSIS — I34.1 NONRHEUMATIC MITRAL (VALVE) PROLAPSE: ICD-10-CM

## 2025-07-22 DIAGNOSIS — Z53.20 PROCEDURE AND TREATMENT NOT CARRIED OUT BECAUSE OF PATIENT'S DECISION FOR UNSPECIFIED REASONS: ICD-10-CM

## 2025-07-22 DIAGNOSIS — Z87.898 PERSONAL HISTORY OF OTHER SPECIFIED CONDITIONS: ICD-10-CM

## 2025-07-22 DIAGNOSIS — L98.9 DISORDER OF THE SKIN AND SUBCUTANEOUS TISSUE, UNSPECIFIED: ICD-10-CM

## 2025-07-22 PROCEDURE — 99396 PREV VISIT EST AGE 40-64: CPT | Mod: 25

## 2025-07-22 PROCEDURE — 93306 TTE W/DOPPLER COMPLETE: CPT

## 2025-07-22 PROCEDURE — 93000 ELECTROCARDIOGRAM COMPLETE: CPT

## 2025-07-30 ENCOUNTER — TRANSCRIPTION ENCOUNTER (OUTPATIENT)
Age: 55
End: 2025-07-30

## 2025-08-01 ENCOUNTER — RESULT REVIEW (OUTPATIENT)
Age: 55
End: 2025-08-01

## 2025-08-08 ENCOUNTER — TRANSCRIPTION ENCOUNTER (OUTPATIENT)
Age: 55
End: 2025-08-08

## 2025-08-13 ENCOUNTER — APPOINTMENT (OUTPATIENT)
Dept: HEART AND VASCULAR | Facility: CLINIC | Age: 55
End: 2025-08-13
Payer: COMMERCIAL

## 2025-08-13 DIAGNOSIS — E78.00 PURE HYPERCHOLESTEROLEMIA, UNSPECIFIED: ICD-10-CM

## 2025-08-13 PROCEDURE — 99212 OFFICE O/P EST SF 10 MIN: CPT | Mod: 95

## 2025-08-13 RX ORDER — PRAVASTATIN SODIUM 10 MG/1
10 TABLET ORAL DAILY
Qty: 30 | Refills: 6 | Status: ACTIVE | COMMUNITY
Start: 2025-08-13 | End: 1900-01-01